# Patient Record
Sex: FEMALE | Race: WHITE | NOT HISPANIC OR LATINO
[De-identification: names, ages, dates, MRNs, and addresses within clinical notes are randomized per-mention and may not be internally consistent; named-entity substitution may affect disease eponyms.]

---

## 2021-01-04 PROBLEM — Z00.00 ENCOUNTER FOR PREVENTIVE HEALTH EXAMINATION: Status: ACTIVE | Noted: 2021-01-04

## 2021-08-16 ENCOUNTER — APPOINTMENT (OUTPATIENT)
Dept: OBGYN | Facility: CLINIC | Age: 41
End: 2021-08-16
Payer: COMMERCIAL

## 2021-08-16 ENCOUNTER — NON-APPOINTMENT (OUTPATIENT)
Age: 41
End: 2021-08-16

## 2021-08-16 VITALS
SYSTOLIC BLOOD PRESSURE: 114 MMHG | WEIGHT: 161 LBS | BODY MASS INDEX: 26.82 KG/M2 | HEIGHT: 65 IN | DIASTOLIC BLOOD PRESSURE: 74 MMHG

## 2021-08-16 DIAGNOSIS — Z78.9 OTHER SPECIFIED HEALTH STATUS: ICD-10-CM

## 2021-08-16 PROCEDURE — 99396 PREV VISIT EST AGE 40-64: CPT

## 2021-08-16 NOTE — PLAN
[FreeTextEntry1] : annual: pap and hpv\par mammogram referral\par doing fine with mirena for now\par likely does not want more kids: d/w pt risks given her age\par

## 2021-08-16 NOTE — HISTORY OF PRESENT ILLNESS
[Patient reported breast sonogram was normal] : Patient reported breast sonogram was normal [Patient reported PAP Smear was normal] : Patient reported PAP Smear was normal [Lighter Bleeding] : bleeding has been lighter than normal [Irregular Duration] : has been irregular [Bleeding Usually Lasts ___ Days] : usually last [unfilled] days [IUD] : has an intrauterine device [Y] : Positive pregnancy history [Normal Amount/Duration] :  normal amount and duration [Currently Active] : currently active [Men] : men [BreastSonogramDate] : 12/2020 [PapSmeardate] : 10/2020 [LMPDate] : 08/12/2021 [MensesLength] : 2 [PGHxTotal] : 2 [de-identified] : Mirena [[facility]] : 2 [PGHxPremature] : 0 [PGHxAbortions] : 0 [[facility]xLiving] : 2 [PGHxABSpont] : 0 [PGHxABInduced] : 0 [PGHxEctopic] : 0 [PGHxMultBirths] : 0 [FreeTextEntry2] : on mirena not really getting much menses

## 2021-08-16 NOTE — PHYSICAL EXAM
[Appropriately responsive] : appropriately responsive [Alert] : alert [No Acute Distress] : no acute distress [No Lymphadenopathy] : no lymphadenopathy [Regular Rate Rhythm] : regular rate rhythm [Clear to Auscultation B/L] : clear to auscultation bilaterally [Soft] : soft [Non-tender] : non-tender [Non-distended] : non-distended [No Mass] : no mass [Oriented x3] : oriented x3 [Examination Of The Breasts] : a normal appearance [No Masses] : no breast masses were palpable [Labia Minora] : normal [Labia Majora] : normal [Normal] : normal [Normal Position] : in a normal position [Uterine Adnexae] : normal

## 2021-08-18 LAB — HPV HIGH+LOW RISK DNA PNL CVX: NOT DETECTED

## 2021-08-19 RX ORDER — CLONAZEPAM 0.5 MG/1
0.5 TABLET ORAL TWICE DAILY
Qty: 45 | Refills: 0 | Status: ACTIVE | COMMUNITY
Start: 2021-08-19 | End: 1900-01-01

## 2021-08-31 LAB — CYTOLOGY CVX/VAG DOC THIN PREP: ABNORMAL

## 2021-09-07 ENCOUNTER — APPOINTMENT (OUTPATIENT)
Dept: OBGYN | Facility: CLINIC | Age: 41
End: 2021-09-07

## 2021-12-03 ENCOUNTER — APPOINTMENT (OUTPATIENT)
Dept: OBGYN | Facility: CLINIC | Age: 41
End: 2021-12-03

## 2022-09-20 ENCOUNTER — APPOINTMENT (OUTPATIENT)
Dept: OBGYN | Facility: CLINIC | Age: 42
End: 2022-09-20

## 2022-09-20 VITALS
HEIGHT: 65 IN | HEART RATE: 75 BPM | DIASTOLIC BLOOD PRESSURE: 70 MMHG | WEIGHT: 161 LBS | BODY MASS INDEX: 26.82 KG/M2 | SYSTOLIC BLOOD PRESSURE: 110 MMHG

## 2022-09-20 DIAGNOSIS — O09.529 SUPERVISION OF ELDERLY MULTIGRAVIDA, UNSPECIFIED TRIMESTER: ICD-10-CM

## 2022-09-20 DIAGNOSIS — Z31.69 ENCOUNTER FOR OTHER GENERAL COUNSELING AND ADVICE ON PROCREATION: ICD-10-CM

## 2022-09-20 PROCEDURE — 58301 REMOVE INTRAUTERINE DEVICE: CPT

## 2022-09-20 PROCEDURE — 99213 OFFICE O/P EST LOW 20 MIN: CPT | Mod: 25

## 2022-09-20 RX ORDER — VITAMIN A, ASCORBIC ACID, VITAMIN D, .ALPHA.-TOCOPHEROL, THIAMINE MONONITRATE, RIBOFLAVIN, NIACIN, PYRIDOXINE HYDROCHLORIDE, FOLIC ACID, CYANOCOBALAMIN, CALCIUM, AND IRON 6000; 60; 400; 10; 1.1; 1.8; 15; 15; 1; 5; 125; 27 [IU]/1; MG/1; [IU]/1; [IU]/1; MG/1; MG/1; MG/1; MG/1; MG/1; UG/1; MG/1; MG/1
CAPSULE ORAL DAILY
Qty: 90 | Refills: 3 | Status: ACTIVE | COMMUNITY
Start: 2022-09-20 | End: 1900-01-01

## 2022-09-20 NOTE — PROCEDURE
[IUD Removal] : intrauterine device (IUD) removal [Risks] : risks [Benefits] : benefits [Alternatives] : alternatives [Fertility Desired] : fertility desired [Speculum Placed] : speculum placed [Nonvisualization of Strings] : nonvisualization of strings [IUD Discarded] : IUD discarded [Tolerated Well] : Patient tolerated the procedure well [No Complications] : no complications [de-identified] : able to retrieve iud strings via forceps in cx canal [FreeTextEntry6] : d/w pt also preconception counseling

## 2022-09-20 NOTE — PLAN
[FreeTextEntry1] : visit consisted of removal of iud but also 20 min counseling and coordination of care regarding TTC again and risks given age 42.\par Over 50% of 20 min visit counseling and coordination of care.\par  d/w pt to take prenatal vitamin, at least 800 mcg folic acid,\par d/w pt risks of pregnancy given age 42, increased risk ges hypertensio/ ges diabetes and increased risk miscarriage and chromosomal abnl\par d/w pt when to TTC\par d/w pt I will recommend low dose ASA at 12 wk of a next pregnancy\par hx of 2 csections, would be for repeat cs.\par did have mild bp elevation at term with her first baby.

## 2022-09-21 ENCOUNTER — TRANSCRIPTION ENCOUNTER (OUTPATIENT)
Age: 42
End: 2022-09-21

## 2022-10-10 ENCOUNTER — APPOINTMENT (OUTPATIENT)
Dept: OBGYN | Facility: CLINIC | Age: 42
End: 2022-10-10

## 2022-10-10 VITALS
BODY MASS INDEX: 26.82 KG/M2 | HEIGHT: 65 IN | SYSTOLIC BLOOD PRESSURE: 108 MMHG | WEIGHT: 161 LBS | DIASTOLIC BLOOD PRESSURE: 75 MMHG

## 2022-10-10 DIAGNOSIS — Z01.419 ENCOUNTER FOR GYNECOLOGICAL EXAMINATION (GENERAL) (ROUTINE) W/OUT ABNORMAL FINDINGS: ICD-10-CM

## 2022-10-10 PROCEDURE — 99396 PREV VISIT EST AGE 40-64: CPT

## 2022-10-10 NOTE — HISTORY OF PRESENT ILLNESS
[Patient reported mammogram was normal] : Patient reported mammogram was normal [Patient reported PAP Smear was normal] : Patient reported PAP Smear was normal [N] : Patient does not use contraception [Monogamous (Male Partner)] : is monogamous with a male partner [Y] : Positive pregnancy history [Normal Amount/Duration] :  normal amount and duration [Regular Cycle Intervals] : periods have been regular [Currently Active] : currently active [Men] : men [No] : No [Mammogramdate] : 10/28/21 [PapSmeardate] : 8/16/21 [LMPDate] : 9/21/22 [MensesFreq] : 28-30 [MensesLength] : 2 [PGHxTotal] : 2 [[facility]] : 2 [FreeTextEntry1] : 9/21/22

## 2022-10-10 NOTE — COUNSELING
Left message for patient to call back.  Will need pharmacy to send in new prescription.   [Preconception Care/ Fertility options] : preconception care, fertility options

## 2022-10-10 NOTE — PLAN
[FreeTextEntry1] : annual: pap and hpv (hx of lgsil in past)\par \par iud recently removed and TTC, issues related to AMA discussed\par will try to get breast imaging with menses (if not pregnant)\par taking prenatal vitamins

## 2022-10-11 LAB — HPV HIGH+LOW RISK DNA PNL CVX: NOT DETECTED

## 2022-10-15 ENCOUNTER — TRANSCRIPTION ENCOUNTER (OUTPATIENT)
Age: 42
End: 2022-10-15

## 2022-10-15 LAB — CYTOLOGY CVX/VAG DOC THIN PREP: NORMAL

## 2022-10-20 DIAGNOSIS — R92.2 INCONCLUSIVE MAMMOGRAM: ICD-10-CM

## 2022-10-20 DIAGNOSIS — R92.8 OTHER ABNORMAL AND INCONCLUSIVE FINDINGS ON DIAGNOSTIC IMAGING OF BREAST: ICD-10-CM

## 2022-10-25 PROBLEM — R92.2 DENSE BREAST TISSUE: Status: ACTIVE | Noted: 2022-10-25

## 2022-10-25 PROBLEM — R92.8 ABNORMAL MAMMOGRAM: Status: ACTIVE | Noted: 2022-10-25

## 2022-10-26 ENCOUNTER — TRANSCRIPTION ENCOUNTER (OUTPATIENT)
Age: 42
End: 2022-10-26

## 2022-12-14 DIAGNOSIS — Z31.41 ENCOUNTER FOR FERTILITY TESTING: ICD-10-CM

## 2023-09-05 RX ORDER — COPPER 313.4 MG/1
INTRAUTERINE DEVICE INTRAUTERINE
Qty: 1 | Refills: 0 | Status: ACTIVE | COMMUNITY
Start: 2023-09-05 | End: 1900-01-01

## 2023-09-13 ENCOUNTER — APPOINTMENT (OUTPATIENT)
Dept: OBGYN | Facility: CLINIC | Age: 43
End: 2023-09-13
Payer: COMMERCIAL

## 2023-09-13 VITALS
SYSTOLIC BLOOD PRESSURE: 107 MMHG | DIASTOLIC BLOOD PRESSURE: 75 MMHG | BODY MASS INDEX: 26.33 KG/M2 | HEIGHT: 65 IN | WEIGHT: 158 LBS | HEART RATE: 73 BPM

## 2023-09-13 DIAGNOSIS — Z11.3 ENCOUNTER FOR SCREENING FOR INFECTIONS WITH A PREDOMINANTLY SEXUAL MODE OF TRANSMISSION: ICD-10-CM

## 2023-09-13 DIAGNOSIS — Z30.430 ENCOUNTER FOR INSERTION OF INTRAUTERINE CONTRACEPTIVE DEVICE: ICD-10-CM

## 2023-09-13 PROCEDURE — 81025 URINE PREGNANCY TEST: CPT

## 2023-09-13 PROCEDURE — 58300 INSERT INTRAUTERINE DEVICE: CPT

## 2023-09-14 LAB
C TRACH RRNA SPEC QL NAA+PROBE: NOT DETECTED
N GONORRHOEA RRNA SPEC QL NAA+PROBE: NOT DETECTED
SOURCE AMPLIFICATION: NORMAL

## 2023-10-11 ENCOUNTER — APPOINTMENT (OUTPATIENT)
Dept: OBGYN | Facility: CLINIC | Age: 43
End: 2023-10-11
Payer: COMMERCIAL

## 2023-10-11 VITALS
WEIGHT: 159 LBS | BODY MASS INDEX: 26.49 KG/M2 | SYSTOLIC BLOOD PRESSURE: 127 MMHG | DIASTOLIC BLOOD PRESSURE: 74 MMHG | HEIGHT: 65 IN | HEART RATE: 60 BPM

## 2023-10-11 DIAGNOSIS — Z80.3 FAMILY HISTORY OF MALIGNANT NEOPLASM OF BREAST: ICD-10-CM

## 2023-10-11 DIAGNOSIS — Z01.419 ENCOUNTER FOR GYNECOLOGICAL EXAMINATION (GENERAL) (ROUTINE) W/OUT ABNORMAL FINDINGS: ICD-10-CM

## 2023-10-11 DIAGNOSIS — Z84.81 FAMILY HISTORY OF CARRIER OF GENETIC DISEASE: ICD-10-CM

## 2023-10-11 PROCEDURE — 99396 PREV VISIT EST AGE 40-64: CPT

## 2023-10-12 PROBLEM — Z01.419 WELL WOMAN EXAM WITH ROUTINE GYNECOLOGICAL EXAM: Status: ACTIVE | Noted: 2022-10-10

## 2023-10-12 LAB — HPV HIGH+LOW RISK DNA PNL CVX: NOT DETECTED

## 2023-10-16 ENCOUNTER — TRANSCRIPTION ENCOUNTER (OUTPATIENT)
Age: 43
End: 2023-10-16

## 2023-10-16 LAB — CYTOLOGY CVX/VAG DOC THIN PREP: NORMAL

## 2023-11-07 ENCOUNTER — APPOINTMENT (OUTPATIENT)
Dept: OBGYN | Facility: CLINIC | Age: 43
End: 2023-11-07
Payer: COMMERCIAL

## 2023-11-07 VITALS
BODY MASS INDEX: 26.13 KG/M2 | DIASTOLIC BLOOD PRESSURE: 73 MMHG | WEIGHT: 157 LBS | OXYGEN SATURATION: 99 % | SYSTOLIC BLOOD PRESSURE: 119 MMHG | HEART RATE: 76 BPM

## 2023-11-07 DIAGNOSIS — Z97.5 PRESENCE OF (INTRAUTERINE) CONTRACEPTIVE DEVICE: ICD-10-CM

## 2023-11-07 PROCEDURE — 58301 REMOVE INTRAUTERINE DEVICE: CPT

## 2023-12-07 ENCOUNTER — APPOINTMENT (OUTPATIENT)
Dept: OBGYN | Facility: CLINIC | Age: 43
End: 2023-12-07
Payer: COMMERCIAL

## 2023-12-07 PROCEDURE — 36415 COLL VENOUS BLD VENIPUNCTURE: CPT

## 2023-12-08 ENCOUNTER — TRANSCRIPTION ENCOUNTER (OUTPATIENT)
Age: 43
End: 2023-12-08

## 2023-12-08 LAB
HCG SERPL-MCNC: 5975 MIU/ML
PROGEST SERPL-MCNC: 20.7 NG/ML

## 2023-12-11 ENCOUNTER — APPOINTMENT (OUTPATIENT)
Dept: OBGYN | Facility: CLINIC | Age: 43
End: 2023-12-11
Payer: COMMERCIAL

## 2023-12-11 PROCEDURE — 36415 COLL VENOUS BLD VENIPUNCTURE: CPT

## 2023-12-12 ENCOUNTER — TRANSCRIPTION ENCOUNTER (OUTPATIENT)
Age: 43
End: 2023-12-12

## 2023-12-12 LAB
HCG SERPL-MCNC: ABNORMAL MIU/ML
PROGEST SERPL-MCNC: 14.7 NG/ML

## 2023-12-15 ENCOUNTER — APPOINTMENT (OUTPATIENT)
Dept: OBGYN | Facility: CLINIC | Age: 43
End: 2023-12-15
Payer: COMMERCIAL

## 2023-12-15 VITALS
DIASTOLIC BLOOD PRESSURE: 73 MMHG | SYSTOLIC BLOOD PRESSURE: 119 MMHG | WEIGHT: 157 LBS | BODY MASS INDEX: 26.13 KG/M2 | HEART RATE: 89 BPM | OXYGEN SATURATION: 98 %

## 2023-12-15 DIAGNOSIS — Z36.89 ENCOUNTER FOR OTHER SPECIFIED ANTENATAL SCREENING: ICD-10-CM

## 2023-12-15 DIAGNOSIS — N92.6 IRREGULAR MENSTRUATION, UNSPECIFIED: ICD-10-CM

## 2023-12-15 DIAGNOSIS — Z32.00 ENCOUNTER FOR PREGNANCY TEST, RESULT UNKNOWN: ICD-10-CM

## 2023-12-15 DIAGNOSIS — Z13.79 ENCOUNTER FOR OTHER SCREENING FOR GENETIC AND CHROMOSOMAL ANOMALIES: ICD-10-CM

## 2023-12-15 PROCEDURE — 99214 OFFICE O/P EST MOD 30 MIN: CPT

## 2023-12-15 PROCEDURE — 76817 TRANSVAGINAL US OBSTETRIC: CPT

## 2023-12-15 PROCEDURE — 81025 URINE PREGNANCY TEST: CPT

## 2023-12-15 NOTE — DISCUSSION/SUMMARY
[FreeTextEntry1] : pregnancy confirmed viable d/w pt high risk issues given her age d/w pt baby ASA at 12 wk hx of GDM hx of 2 csections; will need a third likely will do [facility], refer to MFM d/ w pt routine pregnancy precautions and risk miscarriage given age Over 50% of visit counseling and coordination of care (30 min)

## 2023-12-15 NOTE — PROCEDURE
[Transvaginal OB Sonogram] : Transvaginal OB Sonogram [Intrauterine Pregnancy] : intrauterine pregnancy [Yolk Sac] : yolk sac present [Fetal Heart] : fetal heart present [CRL: ___ (mm)] : CRL - [unfilled]Umm [Current GA by Sonogram: ___ (wks)] : Current GA by Sonogram: [unfilled]Uwks [___ day(s)] : [unfilled] days [Transvaginal OB Sonogram WNL] : Transvaginal OB Sonogram WNL [Amenorrhea] : Amenorrhea [FreeTextEntry1] : viable iup [FreeTextEntry3] : see ob scan

## 2023-12-15 NOTE — PHYSICAL EXAM
[Appropriately responsive] : appropriately responsive [Alert] : alert [No Acute Distress] : no acute distress [Soft] : soft [Non-tender] : non-tender [Non-distended] : non-distended [No HSM] : No HSM [No Lesions] : no lesions [No Mass] : no mass Yes [Oriented x3] : oriented x3 [Labia Majora] : normal [Labia Minora] : normal [Normal] : normal [Uterine Adnexae] : normal [FreeTextEntry6] : gravid 7 wk

## 2023-12-22 ENCOUNTER — TRANSCRIPTION ENCOUNTER (OUTPATIENT)
Age: 43
End: 2023-12-22

## 2023-12-29 ENCOUNTER — APPOINTMENT (OUTPATIENT)
Dept: OBGYN | Facility: CLINIC | Age: 43
End: 2023-12-29
Payer: COMMERCIAL

## 2023-12-29 VITALS
WEIGHT: 163 LBS | OXYGEN SATURATION: 100 % | BODY MASS INDEX: 27.12 KG/M2 | SYSTOLIC BLOOD PRESSURE: 117 MMHG | DIASTOLIC BLOOD PRESSURE: 78 MMHG | HEART RATE: 78 BPM

## 2023-12-29 PROCEDURE — 0502F SUBSEQUENT PRENATAL CARE: CPT

## 2024-01-04 ENCOUNTER — APPOINTMENT (OUTPATIENT)
Dept: OBGYN | Facility: CLINIC | Age: 44
End: 2024-01-04
Payer: COMMERCIAL

## 2024-01-04 VITALS
SYSTOLIC BLOOD PRESSURE: 106 MMHG | BODY MASS INDEX: 27.96 KG/M2 | WEIGHT: 168 LBS | HEART RATE: 87 BPM | OXYGEN SATURATION: 99 % | DIASTOLIC BLOOD PRESSURE: 73 MMHG

## 2024-01-04 PROCEDURE — 36415 COLL VENOUS BLD VENIPUNCTURE: CPT

## 2024-01-04 PROCEDURE — 0502F SUBSEQUENT PRENATAL CARE: CPT

## 2024-01-04 NOTE — PLAN
[FreeTextEntry1] : pregnancy f/u: again confirmed viable pregnancy with good FH normal ges sac and yolk sac high risk due to hx of PET and age (pet with first) also went into labor just 1 or 2 days early with second so hx of cs x 2  advised 2 baby ASA at 12 wks for close f/u

## 2024-01-04 NOTE — PHYSICAL EXAM
[Appropriately responsive] : appropriately responsive [Alert] : alert [No Acute Distress] : no acute distress [Soft] : soft [Non-tender] : non-tender [Non-distended] : non-distended [No HSM] : No HSM [No Lesions] : no lesions [No Mass] : no mass [Oriented x3] : oriented x3 [Labia Majora] : normal [Labia Minora] : normal [Normal] : normal [Uterine Adnexae] : normal [FreeTextEntry6] : gravid , viable pregnancy with good FH

## 2024-01-05 LAB
ALBUMIN SERPL ELPH-MCNC: 4.2 G/DL
ALP BLD-CCNC: 47 U/L
ALT SERPL-CCNC: 25 U/L
ANION GAP SERPL CALC-SCNC: 10 MMOL/L
AST SERPL-CCNC: 21 U/L
BILIRUB SERPL-MCNC: 0.2 MG/DL
BUN SERPL-MCNC: 11 MG/DL
CALCIUM SERPL-MCNC: 8.8 MG/DL
CHLORIDE SERPL-SCNC: 102 MMOL/L
CO2 SERPL-SCNC: 22 MMOL/L
CREAT SERPL-MCNC: 0.47 MG/DL
CREAT SPEC-SCNC: 56 MG/DL
CREAT/PROT UR: 0.1 RATIO
EGFR: 121 ML/MIN/1.73M2
ESTIMATED AVERAGE GLUCOSE: 108 MG/DL
GLUCOSE SERPL-MCNC: 97 MG/DL
HBA1C MFR BLD HPLC: 5.4 %
HCT VFR BLD CALC: 37.2 %
HGB BLD-MCNC: 11.9 G/DL
HIV1+2 AB SPEC QL IA.RAPID: NONREACTIVE
MCHC RBC-ENTMCNC: 29.6 PG
MCHC RBC-ENTMCNC: 32 GM/DL
MCV RBC AUTO: 92.5 FL
PLATELET # BLD AUTO: 288 K/UL
POTASSIUM SERPL-SCNC: 4 MMOL/L
PROT SERPL-MCNC: 6.5 G/DL
PROT UR-MCNC: 6 MG/DL
RBC # BLD: 4.02 M/UL
RBC # FLD: 13.5 %
SODIUM SERPL-SCNC: 134 MMOL/L
TSH SERPL-ACNC: 1.09 UIU/ML
WBC # FLD AUTO: 6.74 K/UL

## 2024-01-08 LAB
ABO + RH PNL BLD: NORMAL
BLD GP AB SCN SERPL QL: NORMAL
HBV SURFACE AG SER QL: NONREACTIVE
HCV AB SER QL: NONREACTIVE
HCV S/CO RATIO: 0.13 S/CO
RUBV IGG FLD-ACNC: 1.6 INDEX
RUBV IGG SER-IMP: POSITIVE
T PALLIDUM AB SER QL IA: NEGATIVE
VZV AB TITR SER: POSITIVE
VZV IGG SER IF-ACNC: 2340 INDEX

## 2024-01-16 ENCOUNTER — TRANSCRIPTION ENCOUNTER (OUTPATIENT)
Age: 44
End: 2024-01-16

## 2024-01-16 RX ORDER — DOXYLAMINE SUCCINATE AND PYRIDOXINE HYDROCHLORIDE 10; 10 MG/1; MG/1
10-10 TABLET, DELAYED RELEASE ORAL
Qty: 60 | Refills: 0 | Status: ACTIVE | COMMUNITY
Start: 2024-01-16 | End: 1900-01-01

## 2024-01-16 RX ORDER — ONDANSETRON 8 MG/1
8 TABLET, ORALLY DISINTEGRATING ORAL
Qty: 3 | Refills: 1 | Status: ACTIVE | COMMUNITY
Start: 2024-01-16 | End: 1900-01-01

## 2024-01-19 ENCOUNTER — ASOB RESULT (OUTPATIENT)
Age: 44
End: 2024-01-19

## 2024-01-19 ENCOUNTER — APPOINTMENT (OUTPATIENT)
Dept: ANTEPARTUM | Facility: CLINIC | Age: 44
End: 2024-01-19
Payer: COMMERCIAL

## 2024-01-19 PROCEDURE — 76813 OB US NUCHAL MEAS 1 GEST: CPT

## 2024-01-19 PROCEDURE — 76801 OB US < 14 WKS SINGLE FETUS: CPT

## 2024-01-19 PROCEDURE — 93976 VASCULAR STUDY: CPT

## 2024-01-19 PROCEDURE — 36415 COLL VENOUS BLD VENIPUNCTURE: CPT

## 2024-01-22 ENCOUNTER — TRANSCRIPTION ENCOUNTER (OUTPATIENT)
Age: 44
End: 2024-01-22

## 2024-01-22 ENCOUNTER — APPOINTMENT (OUTPATIENT)
Dept: ANTEPARTUM | Facility: CLINIC | Age: 44
End: 2024-01-22
Payer: COMMERCIAL

## 2024-01-22 ENCOUNTER — ASOB RESULT (OUTPATIENT)
Age: 44
End: 2024-01-22

## 2024-01-22 PROCEDURE — 59015 CHORION BIOPSY: CPT

## 2024-01-22 PROCEDURE — 76945 ECHO GUIDE VILLUS SAMPLING: CPT

## 2024-01-23 ENCOUNTER — TRANSCRIPTION ENCOUNTER (OUTPATIENT)
Age: 44
End: 2024-01-23

## 2024-01-24 ENCOUNTER — TRANSCRIPTION ENCOUNTER (OUTPATIENT)
Age: 44
End: 2024-01-24

## 2024-01-26 ENCOUNTER — APPOINTMENT (OUTPATIENT)
Dept: OBGYN | Facility: CLINIC | Age: 44
End: 2024-01-26
Payer: COMMERCIAL

## 2024-01-26 VITALS
WEIGHT: 178 LBS | HEART RATE: 93 BPM | SYSTOLIC BLOOD PRESSURE: 114 MMHG | OXYGEN SATURATION: 98 % | DIASTOLIC BLOOD PRESSURE: 77 MMHG | BODY MASS INDEX: 29.62 KG/M2

## 2024-01-26 DIAGNOSIS — O09.91 SUPERVISION OF HIGH RISK PREGNANCY, UNSPECIFIED, FIRST TRIMESTER: ICD-10-CM

## 2024-01-26 PROCEDURE — 0502F SUBSEQUENT PRENATAL CARE: CPT

## 2024-01-26 RX ORDER — ONDANSETRON 8 MG/1
8 TABLET, ORALLY DISINTEGRATING ORAL
Qty: 20 | Refills: 1 | Status: ACTIVE | COMMUNITY
Start: 2024-01-26 | End: 1900-01-01

## 2024-02-02 ENCOUNTER — TRANSCRIPTION ENCOUNTER (OUTPATIENT)
Age: 44
End: 2024-02-02

## 2024-02-21 ENCOUNTER — APPOINTMENT (OUTPATIENT)
Dept: ANTEPARTUM | Facility: CLINIC | Age: 44
End: 2024-02-21
Payer: COMMERCIAL

## 2024-02-21 ENCOUNTER — ASOB RESULT (OUTPATIENT)
Age: 44
End: 2024-02-21

## 2024-02-21 PROCEDURE — 76805 OB US >/= 14 WKS SNGL FETUS: CPT

## 2024-02-21 PROCEDURE — 76817 TRANSVAGINAL US OBSTETRIC: CPT

## 2024-02-28 ENCOUNTER — APPOINTMENT (OUTPATIENT)
Dept: OBGYN | Facility: CLINIC | Age: 44
End: 2024-02-28
Payer: COMMERCIAL

## 2024-02-28 VITALS
HEART RATE: 100 BPM | WEIGHT: 180 LBS | OXYGEN SATURATION: 98 % | SYSTOLIC BLOOD PRESSURE: 121 MMHG | DIASTOLIC BLOOD PRESSURE: 74 MMHG | BODY MASS INDEX: 29.99 KG/M2 | HEIGHT: 65 IN

## 2024-02-28 DIAGNOSIS — O09.92 SUPERVISION OF HIGH RISK PREGNANCY, UNSPECIFIED, SECOND TRIMESTER: ICD-10-CM

## 2024-02-28 PROCEDURE — 0502F SUBSEQUENT PRENATAL CARE: CPT

## 2024-03-01 ENCOUNTER — TRANSCRIPTION ENCOUNTER (OUTPATIENT)
Age: 44
End: 2024-03-01

## 2024-03-04 ENCOUNTER — TRANSCRIPTION ENCOUNTER (OUTPATIENT)
Age: 44
End: 2024-03-04

## 2024-03-04 LAB
AFP MOM: 1.49
AFP VALUE: 54 NG/ML
ALPHA FETOPROTEIN SERUM COMMENT: NORMAL
ALPHA FETOPROTEIN SERUM INTERPRETATION: NORMAL
ALPHA FETOPROTEIN SERUM RESULTS: NORMAL
ALPHA FETOPROTEIN SERUM TEST RESULTS: NORMAL
GESTATIONAL AGE BASED ON: NORMAL
GESTATIONAL AGE ON COLLECTION DATE: 17.4 WEEKS
INSULIN DEP DIABETES: NO
MATERNAL AGE AT EDD AFP: 44.3 YR
MULTIPLE GESTATION: NO
OSBR RISK 1 IN: 2809
RACE: NORMAL
WEIGHT AFP: 180 LBS

## 2024-03-05 ENCOUNTER — TRANSCRIPTION ENCOUNTER (OUTPATIENT)
Age: 44
End: 2024-03-05

## 2024-03-21 ENCOUNTER — NON-APPOINTMENT (OUTPATIENT)
Age: 44
End: 2024-03-21

## 2024-03-21 ENCOUNTER — APPOINTMENT (OUTPATIENT)
Dept: OBGYN | Facility: CLINIC | Age: 44
End: 2024-03-21
Payer: COMMERCIAL

## 2024-03-21 VITALS
WEIGHT: 187 LBS | SYSTOLIC BLOOD PRESSURE: 112 MMHG | BODY MASS INDEX: 31.16 KG/M2 | HEART RATE: 86 BPM | OXYGEN SATURATION: 97 % | HEIGHT: 65 IN | DIASTOLIC BLOOD PRESSURE: 77 MMHG

## 2024-03-21 PROCEDURE — 0502F SUBSEQUENT PRENATAL CARE: CPT

## 2024-03-21 RX ORDER — DOCONEXENT, NIACINAMIDE, .ALPHA.-TOCOPHEROL ACETATE, DL-, CHOLECALCIFEROL, .BETA.-CAROTENE, ASCORBIC ACID, THIAMINE MONONITRATE, RIBOFLAVIN, PYRIDOXINE HYDROCHLORIDE, CYANOCOBALAMIN, IRON, ZINC OXIDE, CUPRIC OXIDE, POTASSIUM IODIDE, MAGNESIUM OXIDE, FOLIC ACID, AND LEVOMEFOLATE CALCIUM 200; 15; 20; 1000; 1100; 30; 1.6; 1.8; 2.5; 12; 29; 25; 2; 150; 20; .4; .6 MG/1; MG/1; [IU]/1; [IU]/1; [IU]/1; MG/1; MG/1; MG/1; MG/1; UG/1; MG/1; MG/1; MG/1; UG/1; MG/1; MG/1; MG/1
29-0.6-0.4-2 CAPSULE, LIQUID FILLED ORAL
Qty: 90 | Refills: 3 | Status: ACTIVE | COMMUNITY
Start: 2024-01-24 | End: 1900-01-01

## 2024-03-25 ENCOUNTER — ASOB RESULT (OUTPATIENT)
Age: 44
End: 2024-03-25

## 2024-03-25 ENCOUNTER — APPOINTMENT (OUTPATIENT)
Dept: ANTEPARTUM | Facility: CLINIC | Age: 44
End: 2024-03-25
Payer: COMMERCIAL

## 2024-03-25 PROCEDURE — 76817 TRANSVAGINAL US OBSTETRIC: CPT

## 2024-03-25 PROCEDURE — 76811 OB US DETAILED SNGL FETUS: CPT

## 2024-04-18 ENCOUNTER — APPOINTMENT (OUTPATIENT)
Dept: OBGYN | Facility: CLINIC | Age: 44
End: 2024-04-18
Payer: COMMERCIAL

## 2024-04-18 ENCOUNTER — NON-APPOINTMENT (OUTPATIENT)
Age: 44
End: 2024-04-18

## 2024-04-18 VITALS
WEIGHT: 194 LBS | SYSTOLIC BLOOD PRESSURE: 115 MMHG | HEART RATE: 112 BPM | BODY MASS INDEX: 32.28 KG/M2 | OXYGEN SATURATION: 99 % | DIASTOLIC BLOOD PRESSURE: 77 MMHG

## 2024-04-18 PROCEDURE — 0502F SUBSEQUENT PRENATAL CARE: CPT

## 2024-04-22 ENCOUNTER — TRANSCRIPTION ENCOUNTER (OUTPATIENT)
Age: 44
End: 2024-04-22

## 2024-04-22 LAB
BASOPHILS # BLD AUTO: 0.03 K/UL
BASOPHILS NFR BLD AUTO: 0.4 %
EOSINOPHIL # BLD AUTO: 0.06 K/UL
EOSINOPHIL NFR BLD AUTO: 0.7 %
GLUCOSE 1H P 50 G GLC PO SERPL-MCNC: 105 MG/DL
HCT VFR BLD CALC: 34 %
HGB BLD-MCNC: 11.1 G/DL
IMM GRANULOCYTES NFR BLD AUTO: 0.4 %
LYMPHOCYTES # BLD AUTO: 1.49 K/UL
LYMPHOCYTES NFR BLD AUTO: 18.3 %
MAN DIFF?: NORMAL
MCHC RBC-ENTMCNC: 31.1 PG
MCHC RBC-ENTMCNC: 32.6 GM/DL
MCV RBC AUTO: 95.2 FL
MONOCYTES # BLD AUTO: 0.48 K/UL
MONOCYTES NFR BLD AUTO: 5.9 %
NEUTROPHILS # BLD AUTO: 6.04 K/UL
NEUTROPHILS NFR BLD AUTO: 74.3 %
PLATELET # BLD AUTO: 255 K/UL
RBC # BLD: 3.57 M/UL
RBC # FLD: 13.9 %
T PALLIDUM AB SER QL IA: NEGATIVE
WBC # FLD AUTO: 8.13 K/UL

## 2024-05-08 ENCOUNTER — APPOINTMENT (OUTPATIENT)
Dept: OBGYN | Facility: CLINIC | Age: 44
End: 2024-05-08
Payer: COMMERCIAL

## 2024-05-08 ENCOUNTER — NON-APPOINTMENT (OUTPATIENT)
Age: 44
End: 2024-05-08

## 2024-05-08 VITALS
HEART RATE: 114 BPM | WEIGHT: 198 LBS | BODY MASS INDEX: 32.99 KG/M2 | HEIGHT: 65 IN | DIASTOLIC BLOOD PRESSURE: 80 MMHG | OXYGEN SATURATION: 97 % | SYSTOLIC BLOOD PRESSURE: 117 MMHG

## 2024-05-08 DIAGNOSIS — O26.812 PREGNANCY RELATED EXHAUSTION AND FATIGUE, SECOND TRIMESTER: ICD-10-CM

## 2024-05-08 PROCEDURE — 0502F SUBSEQUENT PRENATAL CARE: CPT

## 2024-05-09 ENCOUNTER — APPOINTMENT (OUTPATIENT)
Dept: ANTEPARTUM | Facility: CLINIC | Age: 44
End: 2024-05-09
Payer: COMMERCIAL

## 2024-05-09 ENCOUNTER — ASOB RESULT (OUTPATIENT)
Age: 44
End: 2024-05-09

## 2024-05-09 PROCEDURE — 76819 FETAL BIOPHYS PROFIL W/O NST: CPT

## 2024-05-09 PROCEDURE — 76820 UMBILICAL ARTERY ECHO: CPT | Mod: 59

## 2024-05-09 PROCEDURE — 76816 OB US FOLLOW-UP PER FETUS: CPT

## 2024-05-10 ENCOUNTER — TRANSCRIPTION ENCOUNTER (OUTPATIENT)
Age: 44
End: 2024-05-10

## 2024-05-14 DIAGNOSIS — Z98.891 HISTORY OF UTERINE SCAR FROM PREVIOUS SURGERY: ICD-10-CM

## 2024-05-17 LAB
BACTERIA UR CULT: NORMAL
TSH SERPL-ACNC: 1.8 UIU/ML

## 2024-05-23 ENCOUNTER — TRANSCRIPTION ENCOUNTER (OUTPATIENT)
Age: 44
End: 2024-05-23

## 2024-05-24 ENCOUNTER — TRANSCRIPTION ENCOUNTER (OUTPATIENT)
Age: 44
End: 2024-05-24

## 2024-05-24 DIAGNOSIS — Z20.818 CONTACT WITH AND (SUSPECTED) EXPOSURE TO OTHER BACTERIAL COMMUNICABLE DISEASES: ICD-10-CM

## 2024-05-24 DIAGNOSIS — F32.A ANXIETY DISORDER, UNSPECIFIED: ICD-10-CM

## 2024-05-24 DIAGNOSIS — F41.9 ANXIETY DISORDER, UNSPECIFIED: ICD-10-CM

## 2024-05-24 RX ORDER — FLUOXETINE HYDROCHLORIDE 20 MG/1
20 CAPSULE ORAL DAILY
Qty: 30 | Refills: 11 | Status: ACTIVE | COMMUNITY
Start: 2024-05-24 | End: 1900-01-01

## 2024-05-24 RX ORDER — AZITHROMYCIN 250 MG/1
250 TABLET, FILM COATED ORAL
Qty: 1 | Refills: 0 | Status: ACTIVE | COMMUNITY
Start: 2024-05-24 | End: 1900-01-01

## 2024-05-29 ENCOUNTER — APPOINTMENT (OUTPATIENT)
Dept: OBGYN | Facility: CLINIC | Age: 44
End: 2024-05-29
Payer: COMMERCIAL

## 2024-05-29 ENCOUNTER — NON-APPOINTMENT (OUTPATIENT)
Age: 44
End: 2024-05-29

## 2024-05-29 VITALS
SYSTOLIC BLOOD PRESSURE: 109 MMHG | DIASTOLIC BLOOD PRESSURE: 74 MMHG | OXYGEN SATURATION: 97 % | BODY MASS INDEX: 33.49 KG/M2 | HEIGHT: 65 IN | HEART RATE: 113 BPM | WEIGHT: 201 LBS

## 2024-05-29 PROCEDURE — 0502F SUBSEQUENT PRENATAL CARE: CPT

## 2024-06-05 ENCOUNTER — ASOB RESULT (OUTPATIENT)
Age: 44
End: 2024-06-05

## 2024-06-05 ENCOUNTER — APPOINTMENT (OUTPATIENT)
Dept: ANTEPARTUM | Facility: CLINIC | Age: 44
End: 2024-06-05
Payer: COMMERCIAL

## 2024-06-05 PROCEDURE — 76819 FETAL BIOPHYS PROFIL W/O NST: CPT | Mod: 59

## 2024-06-05 PROCEDURE — 76820 UMBILICAL ARTERY ECHO: CPT | Mod: 59

## 2024-06-05 PROCEDURE — 76816 OB US FOLLOW-UP PER FETUS: CPT

## 2024-06-12 ENCOUNTER — NON-APPOINTMENT (OUTPATIENT)
Age: 44
End: 2024-06-12

## 2024-06-12 ENCOUNTER — APPOINTMENT (OUTPATIENT)
Dept: OBGYN | Facility: CLINIC | Age: 44
End: 2024-06-12
Payer: COMMERCIAL

## 2024-06-12 VITALS
SYSTOLIC BLOOD PRESSURE: 128 MMHG | BODY MASS INDEX: 34.78 KG/M2 | WEIGHT: 209 LBS | OXYGEN SATURATION: 98 % | DIASTOLIC BLOOD PRESSURE: 82 MMHG | HEART RATE: 105 BPM

## 2024-06-12 PROCEDURE — 0502F SUBSEQUENT PRENATAL CARE: CPT

## 2024-06-18 ENCOUNTER — APPOINTMENT (OUTPATIENT)
Dept: OBGYN | Facility: CLINIC | Age: 44
End: 2024-06-18
Payer: COMMERCIAL

## 2024-06-18 VITALS
HEART RATE: 118 BPM | DIASTOLIC BLOOD PRESSURE: 76 MMHG | OXYGEN SATURATION: 98 % | SYSTOLIC BLOOD PRESSURE: 123 MMHG | BODY MASS INDEX: 35.11 KG/M2 | WEIGHT: 211 LBS

## 2024-06-18 DIAGNOSIS — O09.93 SUPERVISION OF HIGH RISK PREGNANCY, UNSPECIFIED, THIRD TRIMESTER: ICD-10-CM

## 2024-06-18 PROCEDURE — 0502F SUBSEQUENT PRENATAL CARE: CPT

## 2024-06-20 ENCOUNTER — NON-APPOINTMENT (OUTPATIENT)
Age: 44
End: 2024-06-20

## 2024-06-23 ENCOUNTER — OUTPATIENT (OUTPATIENT)
Dept: OUTPATIENT SERVICES | Facility: HOSPITAL | Age: 44
LOS: 1 days | End: 2024-06-23
Payer: COMMERCIAL

## 2024-06-23 VITALS
RESPIRATION RATE: 19 BRPM | OXYGEN SATURATION: 100 % | HEART RATE: 102 BPM | DIASTOLIC BLOOD PRESSURE: 73 MMHG | TEMPERATURE: 98 F | SYSTOLIC BLOOD PRESSURE: 124 MMHG

## 2024-06-23 DIAGNOSIS — Z3A.34 34 WEEKS GESTATION OF PREGNANCY: ICD-10-CM

## 2024-06-23 DIAGNOSIS — R10.9 UNSPECIFIED ABDOMINAL PAIN: ICD-10-CM

## 2024-06-23 DIAGNOSIS — O26.893 OTHER SPECIFIED PREGNANCY RELATED CONDITIONS, THIRD TRIMESTER: ICD-10-CM

## 2024-06-23 DIAGNOSIS — F41.8 OTHER SPECIFIED ANXIETY DISORDERS: ICD-10-CM

## 2024-06-23 DIAGNOSIS — O99.891 OTHER SPECIFIED DISEASES AND CONDITIONS COMPLICATING PREGNANCY: ICD-10-CM

## 2024-06-23 DIAGNOSIS — O99.343 OTHER MENTAL DISORDERS COMPLICATING PREGNANCY, THIRD TRIMESTER: ICD-10-CM

## 2024-06-23 DIAGNOSIS — O46.93 ANTEPARTUM HEMORRHAGE, UNSPECIFIED, THIRD TRIMESTER: ICD-10-CM

## 2024-06-23 DIAGNOSIS — Z86.32 PERSONAL HISTORY OF GESTATIONAL DIABETES: ICD-10-CM

## 2024-06-23 DIAGNOSIS — O09.213 SUPERVISION OF PREGNANCY WITH HISTORY OF PRE-TERM LABOR, THIRD TRIMESTER: ICD-10-CM

## 2024-06-23 DIAGNOSIS — O34.219 MATERNAL CARE FOR UNSPECIFIED TYPE SCAR FROM PREVIOUS CESAREAN DELIVERY: ICD-10-CM

## 2024-06-23 DIAGNOSIS — Z87.59 PERSONAL HISTORY OF OTHER COMPLICATIONS OF PREGNANCY, CHILDBIRTH AND THE PUERPERIUM: ICD-10-CM

## 2024-06-23 DIAGNOSIS — M54.9 DORSALGIA, UNSPECIFIED: ICD-10-CM

## 2024-06-23 DIAGNOSIS — Z98.891 HISTORY OF UTERINE SCAR FROM PREVIOUS SURGERY: Chronic | ICD-10-CM

## 2024-06-23 DIAGNOSIS — O09.523 SUPERVISION OF ELDERLY MULTIGRAVIDA, THIRD TRIMESTER: ICD-10-CM

## 2024-06-23 DIAGNOSIS — O26.899 OTHER SPECIFIED PREGNANCY RELATED CONDITIONS, UNSPECIFIED TRIMESTER: ICD-10-CM

## 2024-06-23 LAB
AMORPH PHOS CRY # URNS: PRESENT
APPEARANCE UR: ABNORMAL
BACTERIA # UR AUTO: NEGATIVE /HPF — SIGNIFICANT CHANGE UP
BILIRUB UR-MCNC: NEGATIVE — SIGNIFICANT CHANGE UP
CAST: 0 /LPF — SIGNIFICANT CHANGE UP (ref 0–4)
COLOR SPEC: YELLOW — SIGNIFICANT CHANGE UP
DIFF PNL FLD: ABNORMAL
GLUCOSE UR QL: NEGATIVE MG/DL — SIGNIFICANT CHANGE UP
KETONES UR-MCNC: NEGATIVE MG/DL — SIGNIFICANT CHANGE UP
LEUKOCYTE ESTERASE UR-ACNC: ABNORMAL
NITRITE UR-MCNC: NEGATIVE — SIGNIFICANT CHANGE UP
PH UR: 7.5 — SIGNIFICANT CHANGE UP (ref 5–8)
PROT UR-MCNC: NEGATIVE MG/DL — SIGNIFICANT CHANGE UP
RBC CASTS # UR COMP ASSIST: 2 /HPF — SIGNIFICANT CHANGE UP (ref 0–4)
SP GR SPEC: 1.01 — SIGNIFICANT CHANGE UP (ref 1–1.03)
SQUAMOUS # UR AUTO: 17 /HPF — HIGH (ref 0–5)
UROBILINOGEN FLD QL: 0.2 MG/DL — SIGNIFICANT CHANGE UP (ref 0.2–1)
WBC UR QL: 9 /HPF — HIGH (ref 0–5)

## 2024-06-23 PROCEDURE — 87086 URINE CULTURE/COLONY COUNT: CPT

## 2024-06-23 PROCEDURE — 81001 URINALYSIS AUTO W/SCOPE: CPT

## 2024-06-23 PROCEDURE — 59025 FETAL NON-STRESS TEST: CPT

## 2024-06-23 PROCEDURE — 99214 OFFICE O/P EST MOD 30 MIN: CPT

## 2024-06-23 RX ORDER — FLUOXETINE HYDROCHLORIDE 20 MG/1
20 CAPSULE ORAL
Refills: 0 | DISCHARGE

## 2024-06-23 RX ORDER — PRENATAL 136/IRON/FOLIC ACID 27 MG-1 MG
0 TABLET ORAL
Refills: 0 | DISCHARGE

## 2024-06-25 LAB
CULTURE RESULTS: SIGNIFICANT CHANGE UP
SPECIMEN SOURCE: SIGNIFICANT CHANGE UP

## 2024-06-26 ENCOUNTER — NON-APPOINTMENT (OUTPATIENT)
Age: 44
End: 2024-06-26

## 2024-06-26 ENCOUNTER — APPOINTMENT (OUTPATIENT)
Dept: OBGYN | Facility: CLINIC | Age: 44
End: 2024-06-26

## 2024-06-26 VITALS
SYSTOLIC BLOOD PRESSURE: 115 MMHG | DIASTOLIC BLOOD PRESSURE: 79 MMHG | OXYGEN SATURATION: 97 % | HEART RATE: 107 BPM | BODY MASS INDEX: 33.78 KG/M2 | WEIGHT: 203 LBS

## 2024-06-26 PROCEDURE — 0502F SUBSEQUENT PRENATAL CARE: CPT

## 2024-06-27 PROBLEM — F41.9 ANXIETY DISORDER, UNSPECIFIED: Chronic | Status: ACTIVE | Noted: 2024-06-23

## 2024-06-27 PROBLEM — Z87.51 PERSONAL HISTORY OF PRE-TERM LABOR: Chronic | Status: ACTIVE | Noted: 2024-06-23

## 2024-06-27 PROBLEM — O24.419 GESTATIONAL DIABETES MELLITUS IN PREGNANCY, UNSPECIFIED CONTROL: Chronic | Status: ACTIVE | Noted: 2024-06-23

## 2024-06-27 PROBLEM — O14.90 UNSPECIFIED PRE-ECLAMPSIA, UNSPECIFIED TRIMESTER: Chronic | Status: ACTIVE | Noted: 2024-06-23

## 2024-06-28 LAB
HBV SURFACE AG SER QL: NONREACTIVE
HIV1+2 AB SPEC QL IA.RAPID: NONREACTIVE

## 2024-06-29 LAB — B-HEM STREP SPEC QL CULT: NORMAL

## 2024-07-02 ENCOUNTER — ASOB RESULT (OUTPATIENT)
Age: 44
End: 2024-07-02

## 2024-07-02 ENCOUNTER — APPOINTMENT (OUTPATIENT)
Dept: ANTEPARTUM | Facility: CLINIC | Age: 44
End: 2024-07-02
Payer: COMMERCIAL

## 2024-07-02 PROCEDURE — 76820 UMBILICAL ARTERY ECHO: CPT | Mod: 59

## 2024-07-02 PROCEDURE — 76816 OB US FOLLOW-UP PER FETUS: CPT

## 2024-07-02 PROCEDURE — 76819 FETAL BIOPHYS PROFIL W/O NST: CPT

## 2024-07-03 ENCOUNTER — NON-APPOINTMENT (OUTPATIENT)
Age: 44
End: 2024-07-03

## 2024-07-03 ENCOUNTER — APPOINTMENT (OUTPATIENT)
Dept: OBGYN | Facility: CLINIC | Age: 44
End: 2024-07-03
Payer: COMMERCIAL

## 2024-07-03 VITALS
SYSTOLIC BLOOD PRESSURE: 115 MMHG | BODY MASS INDEX: 36.28 KG/M2 | DIASTOLIC BLOOD PRESSURE: 74 MMHG | WEIGHT: 218 LBS | OXYGEN SATURATION: 96 % | HEART RATE: 105 BPM

## 2024-07-03 DIAGNOSIS — O36.60X0 MATERNAL CARE FOR EXCESSIVE FETAL GROWTH, UNSPECIFIED TRIMESTER, NOT APPLICABLE OR UNSPECIFIED: ICD-10-CM

## 2024-07-03 PROCEDURE — 0502F SUBSEQUENT PRENATAL CARE: CPT

## 2024-07-03 RX ORDER — BLOOD-GLUCOSE METER
W/DEVICE KIT MISCELLANEOUS
Qty: 1 | Refills: 0 | Status: ACTIVE | COMMUNITY
Start: 2024-07-03 | End: 1900-01-01

## 2024-07-03 RX ORDER — LANCETS 28 GAUGE
EACH MISCELLANEOUS
Qty: 100 | Refills: 4 | Status: ACTIVE | COMMUNITY
Start: 2024-07-03 | End: 1900-01-01

## 2024-07-03 RX ORDER — BLOOD SUGAR DIAGNOSTIC
STRIP MISCELLANEOUS 4 TIMES DAILY
Qty: 1 | Refills: 3 | Status: ACTIVE | COMMUNITY
Start: 2024-07-03 | End: 1900-01-01

## 2024-07-04 ENCOUNTER — TRANSCRIPTION ENCOUNTER (OUTPATIENT)
Age: 44
End: 2024-07-04

## 2024-07-05 ENCOUNTER — RESULT REVIEW (OUTPATIENT)
Age: 44
End: 2024-07-05

## 2024-07-05 ENCOUNTER — INPATIENT (INPATIENT)
Facility: HOSPITAL | Age: 44
LOS: 1 days | Discharge: HOME | End: 2024-07-07
Attending: OBSTETRICS & GYNECOLOGY | Admitting: OBSTETRICS & GYNECOLOGY
Payer: COMMERCIAL

## 2024-07-05 VITALS
SYSTOLIC BLOOD PRESSURE: 112 MMHG | HEART RATE: 112 BPM | DIASTOLIC BLOOD PRESSURE: 93 MMHG | TEMPERATURE: 98 F | RESPIRATION RATE: 20 BRPM

## 2024-07-05 DIAGNOSIS — Z98.891 HISTORY OF UTERINE SCAR FROM PREVIOUS SURGERY: Chronic | ICD-10-CM

## 2024-07-05 LAB
ALBUMIN SERPL ELPH-MCNC: 3.6 G/DL — SIGNIFICANT CHANGE UP (ref 3.3–5)
ALP SERPL-CCNC: 185 U/L — HIGH (ref 40–120)
ALT FLD-CCNC: 15 U/L — SIGNIFICANT CHANGE UP (ref 10–45)
ANION GAP SERPL CALC-SCNC: 13 MMOL/L — SIGNIFICANT CHANGE UP (ref 5–17)
AST SERPL-CCNC: 20 U/L — SIGNIFICANT CHANGE UP (ref 10–40)
BASOPHILS # BLD AUTO: 0.03 K/UL — SIGNIFICANT CHANGE UP (ref 0–0.2)
BASOPHILS NFR BLD AUTO: 0.3 % — SIGNIFICANT CHANGE UP (ref 0–2)
BILIRUB SERPL-MCNC: 0.2 MG/DL — SIGNIFICANT CHANGE UP (ref 0.2–1.2)
BLD GP AB SCN SERPL QL: NEGATIVE — SIGNIFICANT CHANGE UP
BUN SERPL-MCNC: 10 MG/DL — SIGNIFICANT CHANGE UP (ref 7–23)
CALCIUM SERPL-MCNC: 9.1 MG/DL — SIGNIFICANT CHANGE UP (ref 8.4–10.5)
CHLORIDE SERPL-SCNC: 100 MMOL/L — SIGNIFICANT CHANGE UP (ref 96–108)
CO2 SERPL-SCNC: 21 MMOL/L — LOW (ref 22–31)
CREAT ?TM UR-MCNC: 84 MG/DL — SIGNIFICANT CHANGE UP
CREAT SERPL-MCNC: 0.54 MG/DL — SIGNIFICANT CHANGE UP (ref 0.5–1.3)
EGFR: 116 ML/MIN/1.73M2 — SIGNIFICANT CHANGE UP
EOSINOPHIL # BLD AUTO: 0.04 K/UL — SIGNIFICANT CHANGE UP (ref 0–0.5)
EOSINOPHIL NFR BLD AUTO: 0.4 % — SIGNIFICANT CHANGE UP (ref 0–6)
FIBRINOGEN PPP-MCNC: 499 MG/DL — HIGH (ref 200–445)
GLUCOSE SERPL-MCNC: 68 MG/DL — LOW (ref 70–99)
HCT VFR BLD CALC: 36.4 % — SIGNIFICANT CHANGE UP (ref 34.5–45)
HGB BLD-MCNC: 12 G/DL — SIGNIFICANT CHANGE UP (ref 11.5–15.5)
IMM GRANULOCYTES NFR BLD AUTO: 0.6 % — SIGNIFICANT CHANGE UP (ref 0–0.9)
LDH SERPL L TO P-CCNC: 213 U/L — SIGNIFICANT CHANGE UP (ref 50–242)
LYMPHOCYTES # BLD AUTO: 1.59 K/UL — SIGNIFICANT CHANGE UP (ref 1–3.3)
LYMPHOCYTES # BLD AUTO: 15.4 % — SIGNIFICANT CHANGE UP (ref 13–44)
MCHC RBC-ENTMCNC: 29.5 PG — SIGNIFICANT CHANGE UP (ref 27–34)
MCHC RBC-ENTMCNC: 33 GM/DL — SIGNIFICANT CHANGE UP (ref 32–36)
MCV RBC AUTO: 89.4 FL — SIGNIFICANT CHANGE UP (ref 80–100)
MONOCYTES # BLD AUTO: 0.65 K/UL — SIGNIFICANT CHANGE UP (ref 0–0.9)
MONOCYTES NFR BLD AUTO: 6.3 % — SIGNIFICANT CHANGE UP (ref 2–14)
NEUTROPHILS # BLD AUTO: 7.97 K/UL — HIGH (ref 1.8–7.4)
NEUTROPHILS NFR BLD AUTO: 77 % — SIGNIFICANT CHANGE UP (ref 43–77)
NRBC # BLD: 0 /100 WBCS — SIGNIFICANT CHANGE UP (ref 0–0)
PLATELET # BLD AUTO: 211 K/UL — SIGNIFICANT CHANGE UP (ref 150–400)
POTASSIUM SERPL-MCNC: 3.7 MMOL/L — SIGNIFICANT CHANGE UP (ref 3.5–5.3)
POTASSIUM SERPL-SCNC: 3.7 MMOL/L — SIGNIFICANT CHANGE UP (ref 3.5–5.3)
PROT ?TM UR-MCNC: 24 MG/DL — HIGH (ref 0–12)
PROT SERPL-MCNC: 7.2 G/DL — SIGNIFICANT CHANGE UP (ref 6–8.3)
PROT/CREAT UR-RTO: 0.3 RATIO — HIGH (ref 0–0.2)
RBC # BLD: 4.07 M/UL — SIGNIFICANT CHANGE UP (ref 3.8–5.2)
RBC # FLD: 13.2 % — SIGNIFICANT CHANGE UP (ref 10.3–14.5)
RH IG SCN BLD-IMP: POSITIVE — SIGNIFICANT CHANGE UP
SODIUM SERPL-SCNC: 134 MMOL/L — LOW (ref 135–145)
T PALLIDUM AB TITR SER: NEGATIVE — SIGNIFICANT CHANGE UP
URATE SERPL-MCNC: 4.3 MG/DL — SIGNIFICANT CHANGE UP (ref 2.5–7)
WBC # BLD: 10.34 K/UL — SIGNIFICANT CHANGE UP (ref 3.8–10.5)
WBC # FLD AUTO: 10.34 K/UL — SIGNIFICANT CHANGE UP (ref 3.8–10.5)

## 2024-07-05 PROCEDURE — 88304 TISSUE EXAM BY PATHOLOGIST: CPT | Mod: 26

## 2024-07-05 PROCEDURE — 58600 DIVISION OF FALLOPIAN TUBE: CPT | Mod: U7

## 2024-07-05 PROCEDURE — 58611 LIGATE OVIDUCT(S) ADD-ON: CPT

## 2024-07-05 PROCEDURE — 88307 TISSUE EXAM BY PATHOLOGIST: CPT | Mod: 26

## 2024-07-05 PROCEDURE — 59510 CESAREAN DELIVERY: CPT | Mod: U7

## 2024-07-05 DEVICE — INTERCEED 3 X 4": Type: IMPLANTABLE DEVICE | Status: FUNCTIONAL

## 2024-07-05 RX ORDER — KETOROLAC TROMETHAMINE 30 MG/ML
30 INJECTION, SOLUTION INTRAMUSCULAR EVERY 6 HOURS
Refills: 0 | Status: DISCONTINUED | OUTPATIENT
Start: 2024-07-05 | End: 2024-07-06

## 2024-07-05 RX ORDER — ENOXAPARIN SODIUM 100 MG/ML
40 INJECTION SUBCUTANEOUS ONCE
Refills: 0 | Status: COMPLETED | OUTPATIENT
Start: 2024-07-06 | End: 2024-07-06

## 2024-07-05 RX ORDER — AZITHROMYCIN 250 MG/1
500 TABLET, FILM COATED ORAL ONCE
Refills: 0 | Status: COMPLETED | OUTPATIENT
Start: 2024-07-05 | End: 2024-07-05

## 2024-07-05 RX ORDER — OXYTOCIN 30 [USP'U]/500ML
333.33 INJECTION, SOLUTION INTRAVENOUS
Qty: 20 | Refills: 0 | Status: DISCONTINUED | OUTPATIENT
Start: 2024-07-05 | End: 2024-07-05

## 2024-07-05 RX ORDER — ONDANSETRON HYDROCHLORIDE 2 MG/ML
4 INJECTION INTRAMUSCULAR; INTRAVENOUS ONCE
Refills: 0 | Status: COMPLETED | OUTPATIENT
Start: 2024-07-05 | End: 2024-07-05

## 2024-07-05 RX ORDER — DEXTROSE MONOHYDRATE AND SODIUM CHLORIDE 5; .3 G/100ML; G/100ML
1000 INJECTION, SOLUTION INTRAVENOUS
Refills: 0 | Status: DISCONTINUED | OUTPATIENT
Start: 2024-07-05 | End: 2024-07-05

## 2024-07-05 RX ORDER — TRISODIUM CITRATE DIHYDRATE AND CITRIC ACID MONOHYDRATE 500; 334 MG/5ML; MG/5ML
30 SOLUTION ORAL ONCE
Refills: 0 | Status: DISCONTINUED | OUTPATIENT
Start: 2024-07-05 | End: 2024-07-05

## 2024-07-05 RX ORDER — SIMETHICONE 40MG/0.6ML
80 SUSPENSION, DROPS(FINAL DOSAGE FORM)(ML) ORAL EVERY 4 HOURS
Refills: 0 | Status: DISCONTINUED | OUTPATIENT
Start: 2024-07-05 | End: 2024-07-07

## 2024-07-05 RX ORDER — FLUOXETINE HCL 40 MG
20 CAPSULE ORAL EVERY 24 HOURS
Refills: 0 | Status: DISCONTINUED | OUTPATIENT
Start: 2024-07-05 | End: 2024-07-07

## 2024-07-05 RX ORDER — DEXAMETHASONE 1 MG/1
4 TABLET ORAL EVERY 6 HOURS
Refills: 0 | Status: DISCONTINUED | OUTPATIENT
Start: 2024-07-05 | End: 2024-07-07

## 2024-07-05 RX ORDER — OXYCODONE HYDROCHLORIDE 100 MG/5ML
5 SOLUTION ORAL ONCE
Refills: 0 | Status: DISCONTINUED | OUTPATIENT
Start: 2024-07-05 | End: 2024-07-07

## 2024-07-05 RX ORDER — LANOLIN
1 WAX (GRAM) MISCELLANEOUS EVERY 6 HOURS
Refills: 0 | Status: DISCONTINUED | OUTPATIENT
Start: 2024-07-05 | End: 2024-07-07

## 2024-07-05 RX ORDER — FAMOTIDINE 40 MG
20 TABLET ORAL ONCE
Refills: 0 | Status: COMPLETED | OUTPATIENT
Start: 2024-07-05 | End: 2024-07-05

## 2024-07-05 RX ORDER — ACETAMINOPHEN 325 MG
1000 TABLET ORAL ONCE
Refills: 0 | Status: COMPLETED | OUTPATIENT
Start: 2024-07-05 | End: 2024-07-05

## 2024-07-05 RX ORDER — ACETAMINOPHEN 325 MG
975 TABLET ORAL
Refills: 0 | Status: DISCONTINUED | OUTPATIENT
Start: 2024-07-05 | End: 2024-07-07

## 2024-07-05 RX ORDER — FAMOTIDINE 40 MG
20 TABLET ORAL ONCE
Refills: 0 | Status: DISCONTINUED | OUTPATIENT
Start: 2024-07-05 | End: 2024-07-05

## 2024-07-05 RX ORDER — TRISODIUM CITRATE DIHYDRATE AND CITRIC ACID MONOHYDRATE 500; 334 MG/5ML; MG/5ML
30 SOLUTION ORAL ONCE
Refills: 0 | Status: COMPLETED | OUTPATIENT
Start: 2024-07-05 | End: 2024-07-05

## 2024-07-05 RX ORDER — OXYTOCIN 30 [USP'U]/500ML
333.33 INJECTION, SOLUTION INTRAVENOUS
Qty: 20 | Refills: 0 | Status: DISCONTINUED | OUTPATIENT
Start: 2024-07-05 | End: 2024-07-07

## 2024-07-05 RX ORDER — NALOXONE HYDROCHLORIDE 1 MG/ML
0.1 INJECTION PARENTERAL
Refills: 0 | Status: DISCONTINUED | OUTPATIENT
Start: 2024-07-05 | End: 2024-07-07

## 2024-07-05 RX ORDER — CEFAZOLIN 10 G/1
2000 INJECTION, POWDER, FOR SOLUTION INTRAVENOUS ONCE
Refills: 0 | Status: COMPLETED | OUTPATIENT
Start: 2024-07-05 | End: 2024-07-05

## 2024-07-05 RX ORDER — OXYCODONE HYDROCHLORIDE 100 MG/5ML
5 SOLUTION ORAL
Refills: 0 | Status: COMPLETED | OUTPATIENT
Start: 2024-07-05 | End: 2024-07-12

## 2024-07-05 RX ORDER — TETANUS TOXOID, REDUCED DIPHTHERIA TOXOID AND ACELLULAR PERTUSSIS VACCINE, ADSORBED 5; 2.5; 8; 8; 2.5 [IU]/.5ML; [IU]/.5ML; UG/.5ML; UG/.5ML; UG/.5ML
0.5 SUSPENSION INTRAMUSCULAR ONCE
Refills: 0 | Status: DISCONTINUED | OUTPATIENT
Start: 2024-07-05 | End: 2024-07-07

## 2024-07-05 RX ORDER — DIPHENHYDRAMINE HCL 12.5MG/5ML
25 ELIXIR ORAL EVERY 6 HOURS
Refills: 0 | Status: COMPLETED | OUTPATIENT
Start: 2024-07-05 | End: 2025-06-03

## 2024-07-05 RX ORDER — CEFAZOLIN 10 G/1
2000 INJECTION, POWDER, FOR SOLUTION INTRAVENOUS ONCE
Refills: 0 | Status: DISCONTINUED | OUTPATIENT
Start: 2024-07-05 | End: 2024-07-05

## 2024-07-05 RX ORDER — DEXTROSE MONOHYDRATE AND SODIUM CHLORIDE 5; .3 G/100ML; G/100ML
1000 INJECTION, SOLUTION INTRAVENOUS
Refills: 0 | Status: DISCONTINUED | OUTPATIENT
Start: 2024-07-05 | End: 2024-07-07

## 2024-07-05 RX ADMIN — Medication 1 APPLICATION(S): at 08:53

## 2024-07-05 RX ADMIN — DEXTROSE MONOHYDRATE AND SODIUM CHLORIDE 200 MILLILITER(S): 5; .3 INJECTION, SOLUTION INTRAVENOUS at 08:51

## 2024-07-05 RX ADMIN — Medication 20 MILLIGRAM(S): at 08:51

## 2024-07-05 RX ADMIN — Medication 975 MILLIGRAM(S): at 21:18

## 2024-07-05 RX ADMIN — KETOROLAC TROMETHAMINE 30 MILLIGRAM(S): 30 INJECTION, SOLUTION INTRAMUSCULAR at 18:18

## 2024-07-05 RX ADMIN — Medication 1000 MILLIGRAM(S): at 14:48

## 2024-07-05 RX ADMIN — Medication 20 MILLIGRAM(S): at 22:22

## 2024-07-05 RX ADMIN — AZITHROMYCIN 255 MILLIGRAM(S): 250 TABLET, FILM COATED ORAL at 12:03

## 2024-07-05 RX ADMIN — ONDANSETRON HYDROCHLORIDE 4 MILLIGRAM(S): 2 INJECTION INTRAMUSCULAR; INTRAVENOUS at 08:54

## 2024-07-05 RX ADMIN — OXYTOCIN 1000 MILLIUNIT(S)/MIN: 30 INJECTION, SOLUTION INTRAVENOUS at 14:53

## 2024-07-05 RX ADMIN — CEFAZOLIN 100 MILLIGRAM(S): 10 INJECTION, POWDER, FOR SOLUTION INTRAVENOUS at 11:24

## 2024-07-05 RX ADMIN — Medication 400 MILLIGRAM(S): at 14:30

## 2024-07-05 RX ADMIN — KETOROLAC TROMETHAMINE 30 MILLIGRAM(S): 30 INJECTION, SOLUTION INTRAMUSCULAR at 23:18

## 2024-07-05 RX ADMIN — Medication 80 MILLIGRAM(S): at 22:22

## 2024-07-05 RX ADMIN — Medication 80 MILLIGRAM(S): at 18:20

## 2024-07-05 RX ADMIN — TRISODIUM CITRATE DIHYDRATE AND CITRIC ACID MONOHYDRATE 30 MILLILITER(S): 500; 334 SOLUTION ORAL at 10:05

## 2024-07-05 NOTE — OB PROVIDER H&P - HISTORY OF PRESENT ILLNESS
Patient is a  44y  at 35+6 presenting for r/o ROM. She states at 0400 she felt a big gush of pink tinged fluid which has been leaking ever since. She denies VB, CTX. Endorses nausea and FM.     Ante: Spontaneous pregnancy. CVS for AMA wnl. NIPT and anatomy scan wnl. Passed GCT. Normotensive.   EFW 3320g 96%ile  GBS neg    OBHX:  G1 - 2016 CS elective for PEC w/o SF, GDMA1 at 37w  G2 - 2018 PPROM 36w rCS  G3 - current  GynHX: denies fibroids, ovarian cysts. Remote hx abnormal pap, s/p colpo wnl. Hx genital HSV age 19 was first and only outbreak.   MedHX: denies  Surghx: CS x 2  Medications: PNV, ASA 81 (last taken last night), fluoxetine 20  Allergies: NKDA    Physical Exam:  T(C): 36.8 (24 @ 06:03), Max: 36.8 (24 @ 06:03)  HR: 112 (24 @ 06:03) (112 - 112)  BP: 112/93 (24 @ 06:03) (112/93 - 112/93)  RR: 20 (24 @ 06:03) (20 - 20)  SpO2: --    General: NAD  Pulm: no increased WOB  Abdomen: soft, gravid, nontender  Extremities: wnl     TAUS: Cephalic  VE: ft/soft    EFM: 145 bpm, mod variability, + accels, rare variable decels; reassuring  [place]: CTX q 4 min

## 2024-07-05 NOTE — OB RN PATIENT PROFILE - FALL HARM RISK - UNIVERSAL INTERVENTIONS
Bed in lowest position, wheels locked, appropriate side rails in place/Call bell, personal items and telephone in reach/Instruct patient to call for assistance before getting out of bed or chair/Non-slip footwear when patient is out of bed/[place] to call system/Physically safe environment - no spills, clutter or unnecessary equipment/Purposeful Proactive Rounding/Room/bathroom lighting operational, light cord in reach

## 2024-07-05 NOTE — OB RN PATIENT PROFILE - CAREGIVER RELATION TO PATIENT
The patient was located at Home: [address]  Provider was located at Home ([facility]): [place]         Total time spent for this encounter: Not billed by time    --Quynh Scott MD on 4/22/2023 at 12:46 PM    An electronic signature was used to authenticate this note.
spouse

## 2024-07-05 NOTE — OB RN TRIAGE NOTE - MENTAL HEALTH CONDITIONS/SYMPTOMS, PROFILE
Advised pt of results. Pt stated understanding.    ----- Message from JD Mann sent at 4/28/2021  8:36 AM CDT -----  Regarding: PSA  PSA is low at 0.7 follow up as scheduled  ----- Message -----  From: Brooke Keys MA  Sent: 4/27/2021   7:56 AM CDT  To: JD Mann         depression

## 2024-07-05 NOTE — OB RN TRIAGE NOTE - COMFORT/ACCEPTABLE PAIN LEVEL (0-10)
0 Valtrex Pregnancy And Lactation Text: this medication is Pregnancy Category B and is considered safe during pregnancy. This medication is not directly found in breast milk but it's metabolite acyclovir is present.

## 2024-07-05 NOTE — OB PROVIDER H&P - ASSESSMENT
Face Mask
44y  at 35+6 presenting for r/o ROM    - Admit to L&D  - Consent signed for rCS  - NPO  - IV fluids and labs ordered  - GBS -  - Ancefian ordered for infection ppx  - Continuous EFM until OR    Discussed with attending Dr. Marie Pena, PGY3

## 2024-07-05 NOTE — OB PROVIDER DELIVERY SUMMARY - NSPROVIDERDELIVERYNOTE_OBGYN_ALL_OB_FT
43yo presented at 35w6d with PPROM and repeat c/s #3 with bilateral salpingectomy. Vigorous male infant was delivered using vacuum assisted delivery. Placenta delivered and sent to pathology. Uterus unable to be exteriorized, closed in one layer with 1 vicryl suture. Bilateral salpingectomy performed with ligasure. Serosa of bladder re-approximated. Muscle closed with vicryl. Fascia closed with 1 vicryl. Subq closed with 2-0 vicryl. Skin closed with 3-0 monocryl on keeth needle. EBL 500cc, IVF 2500cc. UOP 250cc. Dictation #[id number]

## 2024-07-05 NOTE — OB PROVIDER DELIVERY SUMMARY - NSSELHIDDEN_OBGYN_ALL_OB_FT
[NS_DeliveryAttending1_OBGYN_ALL_OB_FT:[id number]==],[NS_DeliveryAssist1_OBGYN_ALL_OB_FT:[id number]=],[NS_DeliveryRN_OBGYN_ALL_OB_FT:[id number]=],[NS_CirculateRN2_OBGYN_ALL_OB_FT:[id number]]

## 2024-07-05 NOTE — OB NEONATOLOGY/PEDIATRICIAN DELIVERY SUMMARY - NSPEDSNEONOTESA_OBGYN_ALL_OB_FT
Called to delivery for repeat c/s at 35.6 weeks. Vacuum assist with no pop offs, loose nuchal x1. Baby emerged with good cry and tone. DCC x 30 seconds. Placed on open warmer, D/S/S. PE WNL, 3 vessel cord. Voided x 3. In no acute distress. Pulse ox placed on right wrist, SpO2 at 10 minutes of line 92-97%. Baby brought to the NICU for prematurity.

## 2024-07-05 NOTE — OB RN INTRAOPERATIVE NOTE - NSSELHIDDEN_OBGYN_ALL_OB_FT
[NS_DeliveryAttending1_OBGYN_ALL_OB_FT:[id number]==],[NS_DeliveryAssist1_OBGYN_ALL_OB_FT:[id number]],[NS_DeliveryRN_OBGYN_ALL_OB_FT:[id number]=],[NS_CirculateRN2_OBGYN_ALL_OB_FT:[id number]]

## 2024-07-05 NOTE — OB RN INTRAOPERATIVE NOTE - NS_ELECTROSURGICALUNITBIOMEDNUMBER_OBGYN_ALL_OB_FT
[id number]  pad# [id number]  exp 01/31/2026 [id number]  pad# [id number]  exp 01/31/2026  [facility]# [id number]  exp 04/09/2029

## 2024-07-05 NOTE — OB RN DELIVERY SUMMARY - NSDELAYEDCLAMPA_OBGYN_ALL_OB
Subjective:      History was provided by the father. Sejal Woods is a 8 m.o. male who is brought in for this well child visit. Birth History    Birth     Length: 1' 8\" (0.508 m)     Weight: 6 lb 8.9 oz (2.975 kg)     HC 34.3 cm    Apgar     One: 7     Five: 8    Delivery Method: Vaginal, Vacuum (Extractor)    Gestation Age: 44 2/7 wks    Duration of Labor: 1st: 6h 33m / 2nd: 41m     NMS- ABNORMAL CYSTIC FIBROSIS SCREEN - Reported on 19  Repeated NMS on 19 and mailed out on 19  Repeated NMS - ABNORMAL HEMOGLOBINOPATHY SCREEN - Reported on 3/4/19     Patient Active Problem List    Diagnosis Date Noted    Liveborn infant by vaginal delivery 2019     No past medical history on file. Immunization History   Administered Date(s) Administered    FTuT-Xoi-DBY 2019, 2019, 2019    Hep B, Adol/Ped 2019, 2019, 2019    Pneumococcal Conjugate (PCV-13) 2019, 2019, 2019    Rotavirus, Live, Monovalent Vaccine 2019, 2019     History of previous adverse reactions to immunizations:no    Current Issues:  Current concerns on the part of Addie's father include none. Review of Nutrition:  Current feeding pattern: Similac 6-8oz  Current nutrition:  Baby foods, water    Social Screening:  Current child-care arrangements: in home: primary caregiver: father  Parental coping and self-care: Doing well; no concerns. Secondhand smoke exposure? no    Rear-facing carseat  Has not yet seen a dentist  Objective:     Visit Vitals  Temp 97 °F (36.1 °C) (Rectal)   Ht (!) 2' 4\" (0.711 m)   Wt 19 lb 8.3 oz (8.854 kg)   HC 46.5 cm   BMI 17.50 kg/m²     Growth parameters are noted and are appropriate for age.      General:  alert, no distress, appears stated age, interactive   Skin:  normal   Head:  normal fontanelles, nl appearance, supple neck   Eyes:  sclerae white, pupils equal and reactive, red reflex normal bilaterally   Ears:  normal bilateral Mouth: No perioral or gingival cyanosis or lesions. Tongue is normal in appearance. Lungs:  clear to auscultation bilaterally   Heart:  regular rate and rhythm, S1, S2 normal, no murmur, click, rub or gallop   Abdomen:  soft, non-tender. Bowel sounds normal. No masses,  no organomegaly   Screening DDH:  Ortolani's and Varma's signs absent bilaterally, leg length symmetrical, thigh & gluteal folds symmetrical   :  normal male - testes descended bilaterally   Femoral pulses:  present bilaterally   Extremities:  extremities normal, atraumatic, no cyanosis or edema   Neuro:  alert, moves all extremities spontaneously     Assessment:     Addie is a healthy 10 m.o. old infant here for well check    Plan:     1. Anticipatory guidance: avoiding cow's milk till 15mos old, weaning to cup at 9-12mos of ago, making middle-of-night feeds \"brief & boring\", car seat issues, including proper placement, \"child-proofing\" home with cabinet locks, outlet plugs, window guards and stair, never leave unattended     2. Laboratory screening    Hb or HCT (CDC recc's for children at risk between 9-12mos then again 6mos later; AAP recommends once age 5-12mos): No    3. AP pelvis x-ray to screen for developmental dysplasia of the hip:  no    4. Orders placed during this Well Child Exam:  Orders Placed This Encounter    INFLUENZA VIRUS VACCINE QUADRIVALENT, PRESERVATIVE FREE SYRINGE ([id number])     Order Specific Question:   Was provider counseling for all components provided during this visit? Answer:   Yes     Follow-up and Dispositions    · Return in about 2 months (around 1/15/2020). Yes

## 2024-07-05 NOTE — OB RN DELIVERY SUMMARY - NSSELHIDDEN_OBGYN_ALL_OB_FT
[NS_DeliveryAttending1_OBGYN_ALL_OB_FT:[id number]==],[NS_DeliveryAssist1_OBGYN_ALL_OB_FT:[id number]=],[NS_DeliveryRN_OBGYN_ALL_OB_FT:[id number]],[NS_CirculateRN2_OBGYN_ALL_OB_FT:[id number]=]

## 2024-07-05 NOTE — OB RN PATIENT PROFILE - NS_ISOLATION_OBGYN_ALL_OB
Hx kidney stones, UTI. C/o Left flank pain, urinary frequency, dysuria since yesterday. Felt febrile at home.    None

## 2024-07-06 LAB
BASOPHILS # BLD AUTO: 0.02 K/UL — SIGNIFICANT CHANGE UP (ref 0–0.2)
BASOPHILS NFR BLD AUTO: 0.2 % — SIGNIFICANT CHANGE UP (ref 0–2)
EOSINOPHIL # BLD AUTO: 0.01 K/UL — SIGNIFICANT CHANGE UP (ref 0–0.5)
EOSINOPHIL NFR BLD AUTO: 0.1 % — SIGNIFICANT CHANGE UP (ref 0–6)
HCT VFR BLD CALC: 28.7 % — LOW (ref 34.5–45)
HGB BLD-MCNC: 9.4 G/DL — LOW (ref 11.5–15.5)
IMM GRANULOCYTES NFR BLD AUTO: 0.5 % — SIGNIFICANT CHANGE UP (ref 0–0.9)
LYMPHOCYTES # BLD AUTO: 1.85 K/UL — SIGNIFICANT CHANGE UP (ref 1–3.3)
LYMPHOCYTES # BLD AUTO: 15.5 % — SIGNIFICANT CHANGE UP (ref 13–44)
MCHC RBC-ENTMCNC: 29.5 PG — SIGNIFICANT CHANGE UP (ref 27–34)
MCHC RBC-ENTMCNC: 32.8 GM/DL — SIGNIFICANT CHANGE UP (ref 32–36)
MCV RBC AUTO: 90 FL — SIGNIFICANT CHANGE UP (ref 80–100)
MONOCYTES # BLD AUTO: 0.74 K/UL — SIGNIFICANT CHANGE UP (ref 0–0.9)
MONOCYTES NFR BLD AUTO: 6.2 % — SIGNIFICANT CHANGE UP (ref 2–14)
NEUTROPHILS # BLD AUTO: 9.23 K/UL — HIGH (ref 1.8–7.4)
NEUTROPHILS NFR BLD AUTO: 77.5 % — HIGH (ref 43–77)
NRBC # BLD: 0 /100 WBCS — SIGNIFICANT CHANGE UP (ref 0–0)
PLATELET # BLD AUTO: 181 K/UL — SIGNIFICANT CHANGE UP (ref 150–400)
RBC # BLD: 3.19 M/UL — LOW (ref 3.8–5.2)
RBC # FLD: 13 % — SIGNIFICANT CHANGE UP (ref 10.3–14.5)
WBC # BLD: 11.91 K/UL — HIGH (ref 3.8–10.5)
WBC # FLD AUTO: 11.91 K/UL — HIGH (ref 3.8–10.5)

## 2024-07-06 RX ORDER — DIPHENHYDRAMINE HCL 12.5MG/5ML
25 ELIXIR ORAL EVERY 6 HOURS
Refills: 0 | Status: DISCONTINUED | OUTPATIENT
Start: 2024-07-06 | End: 2024-07-07

## 2024-07-06 RX ADMIN — Medication 600 MILLIGRAM(S): at 23:20

## 2024-07-06 RX ADMIN — Medication 975 MILLIGRAM(S): at 14:42

## 2024-07-06 RX ADMIN — ENOXAPARIN SODIUM 40 MILLIGRAM(S): 100 INJECTION SUBCUTANEOUS at 06:13

## 2024-07-06 RX ADMIN — Medication 25 MILLIGRAM(S): at 05:00

## 2024-07-06 RX ADMIN — Medication 80 MILLIGRAM(S): at 09:47

## 2024-07-06 RX ADMIN — KETOROLAC TROMETHAMINE 30 MILLIGRAM(S): 30 INJECTION, SOLUTION INTRAMUSCULAR at 06:13

## 2024-07-06 RX ADMIN — Medication 80 MILLIGRAM(S): at 02:54

## 2024-07-06 RX ADMIN — Medication 975 MILLIGRAM(S): at 02:53

## 2024-07-06 RX ADMIN — Medication 975 MILLIGRAM(S): at 20:10

## 2024-07-06 RX ADMIN — Medication 600 MILLIGRAM(S): at 17:55

## 2024-07-06 RX ADMIN — Medication 975 MILLIGRAM(S): at 09:47

## 2024-07-06 RX ADMIN — Medication 20 MILLIGRAM(S): at 21:27

## 2024-07-06 RX ADMIN — KETOROLAC TROMETHAMINE 30 MILLIGRAM(S): 30 INJECTION, SOLUTION INTRAMUSCULAR at 11:50

## 2024-07-06 NOTE — PROGRESS NOTE ADULT - ATTENDING COMMENTS
Pt is a now POD#1 s/p uncomplicated ( / c/s).  Patient reports that she feels well. She is ambulating without issue, voiding spontaneously, passing gas and tolerating regular diet. She denies fevers, chills, SOB, sore throat, cough, abdominal pain or any other complaints.     Vitals reviewed and are within normal limits. Appropriate physical exam- abdomen is soft and appropriately tender with firm fundus below the umbilicus. Lochia is mild.    Continue with routine postoperative care.     Patient is in agreement with the plan and has no additional needs or questions at this time. Pt is a now POD#1 s/p repeat c/s #3 with bilateral salpingectomy complicated by cystotomy repair.  Patient reports that she feels well. She is ambulating without issue, issa in place draining clear yellow urine, passing gas and tolerating regular diet. She denies fevers, chills, SOB, sore throat, cough, abdominal pain or any other complaints.     Vitals reviewed and are within normal limits. Appropriate physical exam- abdomen is soft and appropriately tender with firm fundus below the umbilicus. Incision is c/d/i.     Continue with routine postoperative care.     Patient is in agreement with the plan and has no additional needs or questions at this time. Pt is a now POD#1 s/p repeat c/s #3 with bilateral salpingectomy complicated by repair of denuded bladder serosa.  Patient reports that she feels well. She is ambulating without issue, voided 800cc of clear urine, passing gas and tolerating regular diet. She denies fevers, chills, SOB, sore throat, cough, abdominal pain or any other complaints.     Vitals reviewed and are within normal limits. Appropriate physical exam- abdomen is soft and appropriately tender with firm fundus below the umbilicus. Incision is c/d/i.     Continue with routine postoperative care. Pt cleared for discharge home per pt request. Patient is in agreement with the plan and has no additional needs or questions at this time. Pt is a now POD#1 s/p repeat c/s #3 with bilateral salpingectomy complicated by repair of denuded bladder serosa.  Patient reports that she feels well. She is ambulating without issue, issa in place, passing gas and tolerating regular diet. She denies fevers, chills, SOB, sore throat, cough, abdominal pain or any other complaints.     Vitals reviewed and are within normal limits. Appropriate physical exam- abdomen is soft and appropriately tender with firm fundus below the umbilicus. Incision is c/d/i.     Continue with routine postoperative care. Patient is in agreement with the plan and has no additional needs or questions at this time.

## 2024-07-06 NOTE — LACTATION INITIAL EVALUATION - LACTATION INTERVENTIONS
35.6 wk s/p NICU. 26hrs old. mother states baby is able to latch and she supplements with formula. at this time mother declines hands on help as she is waiting for her mother to come visit and she wants her to feed the baby a bottle. discussed pumping to maximize milk supply. plans to pump "at home". pump placed at bedside and mother to alert staff if/when she decides to pump at the hospital. reviewed TFP and supplementation amounts. made aware of LC availability for latch assistance if desired./initiate/review safe skin-to-skin/initiate/review supplementation plan due to medical indications/initiate/review alternate feeding method/reviewed components of an effective feeding and at least 8 effective feedings per day required/reviewed importance of monitoring infant diapers, the breastfeeding log, and minimum output each day/reviewed feeding on demand/by cue at least 8 times a day/recommended follow-up with pediatrician within 24 hours of discharge/reviewed indications of inadequate milk transfer that would require supplementation

## 2024-07-07 VITALS
TEMPERATURE: 98 F | OXYGEN SATURATION: 100 % | SYSTOLIC BLOOD PRESSURE: 122 MMHG | HEART RATE: 95 BPM | RESPIRATION RATE: 18 BRPM | DIASTOLIC BLOOD PRESSURE: 77 MMHG

## 2024-07-07 PROCEDURE — 59050 FETAL MONITOR W/REPORT: CPT

## 2024-07-07 PROCEDURE — 80053 COMPREHEN METABOLIC PANEL: CPT

## 2024-07-07 PROCEDURE — 86780 TREPONEMA PALLIDUM: CPT

## 2024-07-07 PROCEDURE — 86850 RBC ANTIBODY SCREEN: CPT

## 2024-07-07 PROCEDURE — 84156 ASSAY OF PROTEIN URINE: CPT

## 2024-07-07 PROCEDURE — 86900 BLOOD TYPING SEROLOGIC ABO: CPT

## 2024-07-07 PROCEDURE — C1765: CPT

## 2024-07-07 PROCEDURE — 84550 ASSAY OF BLOOD/URIC ACID: CPT

## 2024-07-07 PROCEDURE — 86901 BLOOD TYPING SEROLOGIC RH(D): CPT

## 2024-07-07 PROCEDURE — 85025 COMPLETE CBC W/AUTO DIFF WBC: CPT

## 2024-07-07 PROCEDURE — 36415 COLL VENOUS BLD VENIPUNCTURE: CPT

## 2024-07-07 PROCEDURE — 83615 LACTATE (LD) (LDH) ENZYME: CPT

## 2024-07-07 PROCEDURE — 82570 ASSAY OF URINE CREATININE: CPT

## 2024-07-07 PROCEDURE — 85384 FIBRINOGEN ACTIVITY: CPT

## 2024-07-07 RX ORDER — OXYCODONE HYDROCHLORIDE 100 MG/5ML
5 SOLUTION ORAL
Refills: 0 | Status: DISCONTINUED | OUTPATIENT
Start: 2024-07-07 | End: 2024-07-07

## 2024-07-07 RX ORDER — ACETAMINOPHEN 325 MG
3 TABLET ORAL
Qty: 0 | Refills: 0 | DISCHARGE
Start: 2024-07-07

## 2024-07-07 RX ORDER — OXYCODONE HYDROCHLORIDE 100 MG/5ML
1 SOLUTION ORAL
Qty: 6 | Refills: 0
Start: 2024-07-07 | End: 2024-07-08

## 2024-07-07 RX ADMIN — Medication 975 MILLIGRAM(S): at 02:11

## 2024-07-07 RX ADMIN — OXYCODONE HYDROCHLORIDE 5 MILLIGRAM(S): 100 SOLUTION ORAL at 13:23

## 2024-07-07 RX ADMIN — Medication 600 MILLIGRAM(S): at 05:21

## 2024-07-07 RX ADMIN — OXYCODONE HYDROCHLORIDE 5 MILLIGRAM(S): 100 SOLUTION ORAL at 14:55

## 2024-07-07 RX ADMIN — Medication 975 MILLIGRAM(S): at 09:13

## 2024-07-07 RX ADMIN — Medication 600 MILLIGRAM(S): at 12:25

## 2024-07-07 NOTE — DISCHARGE NOTE OB - CARE PROVIDER_API CALL
Aleyda Alex  Obstetrics and Gynecology  [address] [place] [id number]  Phone: [phone]  Fax: [phone]  Follow Up Time:

## 2024-07-07 NOTE — DISCHARGE NOTE OB - NS MD DC FALL RISK RISK
For information on Fall & Injury Prevention, visit: [email or web address] OR  [email or web address] OR  https://www.cdc.gov/steadi/patient.html

## 2024-07-07 NOTE — DISCHARGE NOTE OB - PATIENT PORTAL LINK FT
You can access the FollowMyHealth Patient Portal offered by [facility] by registering at the following website: [email or web address]. By joining [facility]’s FollowMyHealth portal, you will also be able to view your health information using other applications (apps) compatible with our system.

## 2024-07-07 NOTE — DISCHARGE NOTE OB - MEDICATION SUMMARY - MEDICATIONS TO TAKE
I will START or STAY ON the medications listed below when I get home from the hospital:    ibuprofen 600 mg oral tablet  -- 1 tab(s) by mouth every 6 hours  -- Indication: For Pain    acetaminophen 325 mg oral tablet  -- 3 tab(s) by mouth every 6 hours as needed for  mild pain  -- Indication: For Pain    oxyCODONE 5 mg oral tablet  -- 1 tab(s) by mouth every 8 hours as needed for  severe pain MDD: 3  -- Indication: For Pain    FLUoxetine  -- 20 milligram(s) by mouth once a day  -- Indication: For home    multivitamin, prenatal  -- Indication: For HOme   I will START or STAY ON the medications listed below when I get home from the hospital:    ibuprofen 600 mg oral tablet  -- 1 tab(s) by mouth every 6 hours  -- Indication: For Pain    acetaminophen 325 mg oral tablet  -- 3 tab(s) by mouth every 6 hours as needed for  mild pain  -- Indication: For Pain    oxyCODONE 5 mg oral tablet  -- 1 tab(s) by mouth every 8 hours as needed for  severe pain MDD: 3  -- Indication: For Pain    FLUoxetine  -- 20 milligram(s) by mouth once a day  -- Indication: For home    multivitamin, prenatal  -- Indication: For home

## 2024-07-07 NOTE — DISCHARGE NOTE OB - CAREGIVER ADDRESS
Called daughter; mailbox full     ----- Message from YENY Sands sent at 3/4/2022 12:30 PM CST -----  Labs reviewed.  Bump in BUN and creatinine, potassium and drop in sodium.  However per CardioMEMS she is on the upper end of her range so not dehydrated per se.  Discussed with team and recommend stopping Entresto.  This should help renal function and sodium as well as potassium to come back down.  In the interim just to avoid high potassium foods for the next week.  We will continue to monitor CardioMEMS and adjust diuretic as needed.    
same as pt

## 2024-07-07 NOTE — DISCHARGE NOTE OB - CHANGE SANITARY PADS FREQUENTLY.  WASHING AND WIPING SHOULD OCCUR FROM FRONT TO BACK
----- Message from Cristina Díaz sent at 3/25/2019  9:44 AM CDT -----  Contact: Pt    Name of Who is Calling:ALEX LINDO [[id number]]    What is the request in detail: patient would like a call back regarding hormone replacement medication dosage have changed Please contact to further discuss and advise    Can the clinic reply by [facility]: No    What Number to Call Back if not in [facility]: [phone]                                   Statement Selected

## 2024-07-07 NOTE — PROGRESS NOTE ADULT - SUBJECTIVE AND OBJECTIVE BOX
Patient evaluated at bedside this morning, resting comfortable in bed, with no acute events overnight.  She reports pain is well controlled with oral pain medications.   She denies headache, dizziness, chest pain, palpitations, shortness of breath, nausea, vomiting or heavy vaginal bleeding.  She is ambulating,  issa remains in place at this time. Tolerating regular diet.     Physical Exam:  Vital Signs Last 24 Hrs  T(C): 36.6 (06 Jul 2024 02:30), Max: 36.9 (05 Jul 2024 21:36)  T(F): 97.9 (06 Jul 2024 02:30), Max: 98.4 (05 Jul 2024 21:36)  HR: 75 (06 Jul 2024 02:30) (70 - 112)  BP: 111/69 (06 Jul 2024 02:30) (99/58 - 143/83)  BP(mean): 78 (05 Jul 2024 15:03) (74 - 78)  RR: 18 (06 Jul 2024 02:30) (17 - 20)  SpO2: 96% (06 Jul 2024 02:30) (93% - 99%)    Parameters below as of 06 Jul 2024 02:30  Patient On (Oxygen Delivery Method): room air        GA: NAD, A+0 x 3  Pulm: no increased WOB  Abd: soft, nontender, nondistended, no rebound or guarding, incision clean, dry and intact, uterus firm at midline, 2 fb below umbilicus  : issa in situ, lochia WNL  Extremities: no calf tenderness, SCDs in place                            12.0   10.34 )-----------( 211      ( 05 Jul 2024 08:27 )             36.4     07-05    134<L>  |  100  |  10  ----------------------------<  68<L>  3.7   |  21<L>  |  0.54    Ca    9.1      05 Jul 2024 11:16    TPro  7.2  /  Alb  3.6  /  TBili  0.2  /  DBili  x   /  AST  20  /  ALT  15  /  AlkPhos  185<H>  07-05        acetaminophen     Tablet .. 975 milliGRAM(s) Oral <User Schedule>  dexAMETHasone  Injectable 4 milliGRAM(s) IV Push every 6 hours PRN  diphenhydrAMINE 25 milliGRAM(s) Oral every 6 hours PRN  diphtheria/tetanus/pertussis (acellular) Vaccine (Adacel) 0.5 milliLiter(s) IntraMuscular once  enoxaparin Injectable 40 milliGRAM(s) SubCutaneous once  FLUoxetine 20 milliGRAM(s) Oral every 24 hours  ibuprofen  Tablet. 600 milliGRAM(s) Oral every 6 hours  ketorolac   Injectable 30 milliGRAM(s) IV Push every 6 hours  lactated ringers. 1000 milliLiter(s) IV Continuous <Continuous>  lanolin Ointment 1 Application(s) Topical every 6 hours PRN  magnesium hydroxide Suspension 30 milliLiter(s) Oral two times a day PRN  naloxone Injectable 0.1 milliGRAM(s) IV Push every 3 minutes PRN  oxyCODONE    IR 5 milliGRAM(s) Oral every 3 hours PRN  oxyCODONE    IR 5 milliGRAM(s) Oral once PRN  oxytocin Infusion 333.333 milliUNIT(s)/Min IV Continuous <Continuous>  simethicone 80 milliGRAM(s) Chew every 4 hours PRN  
Patient evaluated at bedside this morning, resting comfortable in bed, no acute events overnight. She reports pain is well-controlled.  She denies headache, dizziness, changes in vision, chest pain, palpitations, shortness of breath, RUQ pain, nausea, vomiting, fever, chills, heavy vaginal bleeding.  She has been ambulating without assistance, tolerating food.      Physical Exam:  T(C): 36.6 (07-07-24 @ 05:23), Max: 36.6 (07-06-24 @ 22:05)  HR: 98 (07-07-24 @ 05:23) (78 - 98)  BP: 115/76 (07-07-24 @ 05:23) (111/73 - 115/76)  RR: 16 (07-07-24 @ 05:23) (16 - 18)  SpO2: 98% (07-07-24 @ 05:23) (95% - 98%)    Gen: no apparent distress  Pulm: no increased work of breathing  Abd: soft, nontender, nondistended, no rebound or guarding, uterus firm; incision clean dry intact  Extremities: trace pedal edema bilaterally, no calf tenderness bilaterally                          9.4    11.91 )-----------( 181      ( 06 Jul 2024 06:46 )             28.7     07-05    134<L>  |  100  |  10  ----------------------------<  68<L>  3.7   |  21<L>  |  0.54    Ca    9.1      05 Jul 2024 11:16    TPro  7.2  /  Alb  3.6  /  TBili  0.2  /  DBili  x   /  AST  20  /  ALT  15  /  AlkPhos  185<H>  07-05    acetaminophen     Tablet .. 975 milliGRAM(s) Oral <User Schedule>  dexAMETHasone  Injectable 4 milliGRAM(s) IV Push every 6 hours PRN  diphenhydrAMINE 25 milliGRAM(s) Oral every 6 hours PRN  diphtheria/tetanus/pertussis (acellular) Vaccine (Adacel) 0.5 milliLiter(s) IntraMuscular once  FLUoxetine 20 milliGRAM(s) Oral every 24 hours  ibuprofen  Tablet. 600 milliGRAM(s) Oral every 6 hours  lactated ringers. 1000 milliLiter(s) IV Continuous <Continuous>  lanolin Ointment 1 Application(s) Topical every 6 hours PRN  magnesium hydroxide Suspension 30 milliLiter(s) Oral two times a day PRN  naloxone Injectable 0.1 milliGRAM(s) IV Push every 3 minutes PRN  oxyCODONE    IR 5 milliGRAM(s) Oral every 3 hours PRN  oxyCODONE    IR 5 milliGRAM(s) Oral once PRN  oxytocin Infusion 333.333 milliUNIT(s)/Min IV Continuous <Continuous>  simethicone 80 milliGRAM(s) Chew every 4 hours PRN

## 2024-07-07 NOTE — PROGRESS NOTE ADULT - ASSESSMENT
A/P  44y s/p repeat CS and BS, POD# 2, stable, meeting postpartum milestones   - Acute blood loss anemia: Hgb 9.4 postoperative; patient asymptomatic  - Pain: well controlled on analgesics as above  - GI: Tolerating regular diet  - : issa in place; will remove today and f/u TOV  - DVT prophylaxis: ambulating frequently  - Dispo: PPD 2, unless otherwise specified    
44y Female POD#1  s/p repeat C/S and bilateral salpingectomy                                 - Neuro/Pain:  toradol atc, tylenol atc, oxy prn  - CV:  VS per routine, AM CBC pending  - Pulm: Encourage ISS & Ambulation  - GI: regular diet   - : Johnson in place x 48 hours   - DVT ppx: SCDs, Lovenox 40mg QD  - Dispo: POD #2/3

## 2024-07-07 NOTE — DISCHARGE NOTE OB - HOSPITAL COURSE
pt s/p C/S #3+BS after presenting for PPROM at 35+6 wks. C/S c/b sersal repair of bladder. Kept issa in for 2 days and then passed TOV. pp course uncomplicated, meeting pp milestones. Clinically and hemodynamically stable for d/c.

## 2024-07-07 NOTE — PROGRESS NOTE ADULT - ATTENDING COMMENTS
Pt is a now POD#2 s/p repeat c/s #3 with bilateral salpingectomy complicated by repair of denuded bladder serosa.  Patient reports that she feels well. She is ambulating without issue, voided 800cc of clear urine, passing gas and tolerating regular diet. She denies fevers, chills, SOB, sore throat, cough, abdominal pain or any other complaints.     Vitals reviewed and are within normal limits. Appropriate physical exam- abdomen is soft and appropriately tender with firm fundus below the umbilicus. Incision is c/d/i.     Continue with routine postoperative care. Pt cleared for discharge home per pt request. Patient is in agreement with the plan and has no additional needs or questions at this time.

## 2024-07-09 ENCOUNTER — APPOINTMENT (OUTPATIENT)
Dept: ANTEPARTUM | Facility: CLINIC | Age: 44
End: 2024-07-09

## 2024-07-10 ENCOUNTER — NON-APPOINTMENT (OUTPATIENT)
Age: 44
End: 2024-07-10

## 2024-07-10 ENCOUNTER — APPOINTMENT (OUTPATIENT)
Dept: OBGYN | Facility: CLINIC | Age: 44
End: 2024-07-10

## 2024-07-10 LAB — SURGICAL PATHOLOGY STUDY: SIGNIFICANT CHANGE UP

## 2024-07-11 ENCOUNTER — NON-APPOINTMENT (OUTPATIENT)
Age: 44
End: 2024-07-11

## 2024-07-13 DIAGNOSIS — O34.211 MATERNAL CARE FOR LOW TRANSVERSE SCAR FROM PREVIOUS CESAREAN DELIVERY: ICD-10-CM

## 2024-07-13 DIAGNOSIS — Z3A.35 35 WEEKS GESTATION OF PREGNANCY: ICD-10-CM

## 2024-07-13 DIAGNOSIS — D62 ACUTE POSTHEMORRHAGIC ANEMIA: ICD-10-CM

## 2024-07-18 ENCOUNTER — NON-APPOINTMENT (OUTPATIENT)
Age: 44
End: 2024-07-18

## 2024-07-18 ENCOUNTER — APPOINTMENT (OUTPATIENT)
Dept: OBGYN | Facility: CLINIC | Age: 44
End: 2024-07-18

## 2024-07-18 VITALS
HEART RATE: 88 BPM | WEIGHT: 192 LBS | BODY MASS INDEX: 31.95 KG/M2 | DIASTOLIC BLOOD PRESSURE: 78 MMHG | SYSTOLIC BLOOD PRESSURE: 119 MMHG | OXYGEN SATURATION: 98 %

## 2024-07-18 PROCEDURE — 0503F POSTPARTUM CARE VISIT: CPT

## 2024-08-21 ENCOUNTER — APPOINTMENT (OUTPATIENT)
Dept: OBGYN | Facility: CLINIC | Age: 44
End: 2024-08-21

## 2024-08-21 VITALS
SYSTOLIC BLOOD PRESSURE: 109 MMHG | WEIGHT: 185 LBS | OXYGEN SATURATION: 97 % | HEART RATE: 85 BPM | BODY MASS INDEX: 30.79 KG/M2 | DIASTOLIC BLOOD PRESSURE: 64 MMHG

## 2024-08-21 DIAGNOSIS — Z12.31 ENCOUNTER FOR SCREENING MAMMOGRAM FOR MALIGNANT NEOPLASM OF BREAST: ICD-10-CM

## 2024-08-21 DIAGNOSIS — M62.89 OTHER SPECIFIED DISORDERS OF MUSCLE: ICD-10-CM

## 2024-08-21 PROCEDURE — 0503F POSTPARTUM CARE VISIT: CPT

## 2024-10-24 ENCOUNTER — TRANSCRIPTION ENCOUNTER (OUTPATIENT)
Age: 44
End: 2024-10-24

## 2025-02-19 ENCOUNTER — APPOINTMENT (OUTPATIENT)
Dept: OBGYN | Facility: CLINIC | Age: 45
End: 2025-02-19
Payer: COMMERCIAL

## 2025-02-19 VITALS
SYSTOLIC BLOOD PRESSURE: 111 MMHG | HEART RATE: 81 BPM | OXYGEN SATURATION: 97 % | WEIGHT: 176.37 LBS | BODY MASS INDEX: 29.35 KG/M2 | DIASTOLIC BLOOD PRESSURE: 68 MMHG

## 2025-02-19 DIAGNOSIS — Z12.11 ENCOUNTER FOR SCREENING FOR MALIGNANT NEOPLASM OF COLON: ICD-10-CM

## 2025-02-19 PROCEDURE — 99396 PREV VISIT EST AGE 40-64: CPT

## 2025-02-28 ENCOUNTER — NON-APPOINTMENT (OUTPATIENT)
Age: 45
End: 2025-02-28

## 2025-02-28 LAB
CYTOLOGY CVX/VAG DOC THIN PREP: ABNORMAL
HPV HIGH+LOW RISK DNA PNL CVX: NOT DETECTED

## 2025-04-09 ENCOUNTER — TRANSCRIPTION ENCOUNTER (OUTPATIENT)
Age: 45
End: 2025-04-09

## 2025-04-11 ENCOUNTER — TRANSCRIPTION ENCOUNTER (OUTPATIENT)
Age: 45
End: 2025-04-11

## 2025-04-11 DIAGNOSIS — R92.8 OTHER ABNORMAL AND INCONCLUSIVE FINDINGS ON DIAGNOSTIC IMAGING OF BREAST: ICD-10-CM

## 2025-04-22 DIAGNOSIS — R92.1 MAMMOGRAPHIC CALCIFICATION FOUND ON DIAGNOSTIC IMAGING OF BREAST: ICD-10-CM

## 2025-04-23 ENCOUNTER — RESULT REVIEW (OUTPATIENT)
Age: 45
End: 2025-04-23

## 2025-05-05 ENCOUNTER — TRANSCRIPTION ENCOUNTER (OUTPATIENT)
Age: 45
End: 2025-05-05

## 2025-05-05 DIAGNOSIS — Z98.890 OTHER SPECIFIED POSTPROCEDURAL STATES: ICD-10-CM

## 2025-05-06 ENCOUNTER — NON-APPOINTMENT (OUTPATIENT)
Age: 45
End: 2025-05-06

## 2025-05-06 ENCOUNTER — TRANSCRIPTION ENCOUNTER (OUTPATIENT)
Age: 45
End: 2025-05-06

## 2025-05-06 ENCOUNTER — APPOINTMENT (OUTPATIENT)
Dept: INTERNAL MEDICINE | Facility: CLINIC | Age: 45
End: 2025-05-06
Payer: COMMERCIAL

## 2025-05-06 VITALS
SYSTOLIC BLOOD PRESSURE: 111 MMHG | HEIGHT: 65 IN | TEMPERATURE: 97.9 F | DIASTOLIC BLOOD PRESSURE: 79 MMHG | BODY MASS INDEX: 27.82 KG/M2 | WEIGHT: 167 LBS | OXYGEN SATURATION: 99 % | HEART RATE: 73 BPM

## 2025-05-06 DIAGNOSIS — Z30.430 ENCOUNTER FOR INSERTION OF INTRAUTERINE CONTRACEPTIVE DEVICE: ICD-10-CM

## 2025-05-06 DIAGNOSIS — F32.A ANXIETY DISORDER, UNSPECIFIED: ICD-10-CM

## 2025-05-06 DIAGNOSIS — Z13.79 ENCOUNTER FOR OTHER SCREENING FOR GENETIC AND CHROMOSOMAL ANOMALIES: ICD-10-CM

## 2025-05-06 DIAGNOSIS — Z32.00 ENCOUNTER FOR PREGNANCY TEST, RESULT UNKNOWN: ICD-10-CM

## 2025-05-06 DIAGNOSIS — Z97.5 PRESENCE OF (INTRAUTERINE) CONTRACEPTIVE DEVICE: ICD-10-CM

## 2025-05-06 DIAGNOSIS — F41.9 ANXIETY DISORDER, UNSPECIFIED: ICD-10-CM

## 2025-05-06 DIAGNOSIS — Z13.220 ENCOUNTER FOR SCREENING FOR LIPOID DISORDERS: ICD-10-CM

## 2025-05-06 DIAGNOSIS — Z31.69 ENCOUNTER FOR OTHER GENERAL COUNSELING AND ADVICE ON PROCREATION: ICD-10-CM

## 2025-05-06 DIAGNOSIS — Z31.41 ENCOUNTER FOR FERTILITY TESTING: ICD-10-CM

## 2025-05-06 DIAGNOSIS — N92.6 IRREGULAR MENSTRUATION, UNSPECIFIED: ICD-10-CM

## 2025-05-06 DIAGNOSIS — Z01.00 ENCOUNTER FOR EXAMINATION OF EYES AND VISION W/OUT ABNORMAL FINDINGS: ICD-10-CM

## 2025-05-06 DIAGNOSIS — H53.001 UNSPECIFIED AMBLYOPIA, RIGHT EYE: ICD-10-CM

## 2025-05-06 DIAGNOSIS — O09.529 SUPERVISION OF ELDERLY MULTIGRAVIDA, UNSPECIFIED TRIMESTER: ICD-10-CM

## 2025-05-06 PROCEDURE — 36415 COLL VENOUS BLD VENIPUNCTURE: CPT

## 2025-05-06 PROCEDURE — 99386 PREV VISIT NEW AGE 40-64: CPT

## 2025-05-07 ENCOUNTER — TRANSCRIPTION ENCOUNTER (OUTPATIENT)
Age: 45
End: 2025-05-07

## 2025-05-07 LAB
ALBUMIN SERPL ELPH-MCNC: 4.4 G/DL
ALP BLD-CCNC: 55 U/L
ALT SERPL-CCNC: 17 U/L
ANION GAP SERPL CALC-SCNC: 11 MMOL/L
AST SERPL-CCNC: 20 U/L
BASOPHILS # BLD AUTO: 0.05 K/UL
BASOPHILS NFR BLD AUTO: 0.9 %
BILIRUB SERPL-MCNC: 0.3 MG/DL
BUN SERPL-MCNC: 11 MG/DL
CALCIUM SERPL-MCNC: 9.4 MG/DL
CHLORIDE SERPL-SCNC: 105 MMOL/L
CHOLEST SERPL-MCNC: 208 MG/DL
CO2 SERPL-SCNC: 24 MMOL/L
CREAT SERPL-MCNC: 0.61 MG/DL
EGFRCR SERPLBLD CKD-EPI 2021: 112 ML/MIN/1.73M2
EOSINOPHIL # BLD AUTO: 0.08 K/UL
EOSINOPHIL NFR BLD AUTO: 1.4 %
ESTIMATED AVERAGE GLUCOSE: 105 MG/DL
GLUCOSE SERPL-MCNC: 93 MG/DL
HBA1C MFR BLD HPLC: 5.3 %
HCT VFR BLD CALC: 40.2 %
HDLC SERPL-MCNC: 63 MG/DL
HGB BLD-MCNC: 12.8 G/DL
IMM GRANULOCYTES NFR BLD AUTO: 0 %
LDLC SERPL-MCNC: 130 MG/DL
LYMPHOCYTES # BLD AUTO: 2.58 K/UL
LYMPHOCYTES NFR BLD AUTO: 44.8 %
MAN DIFF?: NORMAL
MCHC RBC-ENTMCNC: 29.2 PG
MCHC RBC-ENTMCNC: 31.8 G/DL
MCV RBC AUTO: 91.8 FL
MONOCYTES # BLD AUTO: 0.45 K/UL
MONOCYTES NFR BLD AUTO: 7.8 %
NEUTROPHILS # BLD AUTO: 2.6 K/UL
NEUTROPHILS NFR BLD AUTO: 45.1 %
NONHDLC SERPL-MCNC: 144 MG/DL
PLATELET # BLD AUTO: 322 K/UL
POTASSIUM SERPL-SCNC: 4.4 MMOL/L
PROT SERPL-MCNC: 7.1 G/DL
RBC # BLD: 4.38 M/UL
RBC # FLD: 13.2 %
SODIUM SERPL-SCNC: 140 MMOL/L
TRIGL SERPL-MCNC: 79 MG/DL
TSH SERPL-ACNC: 1.35 UIU/ML
WBC # FLD AUTO: 5.76 K/UL

## 2025-05-08 ENCOUNTER — TRANSCRIPTION ENCOUNTER (OUTPATIENT)
Age: 45
End: 2025-05-08

## 2025-05-09 ENCOUNTER — APPOINTMENT (OUTPATIENT)
Dept: ORTHOPEDIC SURGERY | Age: 45
End: 2025-05-09
Payer: COMMERCIAL

## 2025-05-09 VITALS — RESPIRATION RATE: 16 BRPM | BODY MASS INDEX: 27.49 KG/M2 | WEIGHT: 165 LBS | HEIGHT: 65 IN

## 2025-05-09 DIAGNOSIS — M25.559 PAIN IN UNSPECIFIED HIP: ICD-10-CM

## 2025-05-09 DIAGNOSIS — M62.89 OTHER SPECIFIED DISORDERS OF MUSCLE: ICD-10-CM

## 2025-05-09 PROCEDURE — 99204 OFFICE O/P NEW MOD 45 MIN: CPT

## 2025-05-30 ENCOUNTER — TRANSCRIPTION ENCOUNTER (OUTPATIENT)
Age: 45
End: 2025-05-30

## 2025-05-30 DIAGNOSIS — Z98.890 OTHER SPECIFIED POSTPROCEDURAL STATES: ICD-10-CM

## 2025-06-18 ENCOUNTER — TRANSCRIPTION ENCOUNTER (OUTPATIENT)
Age: 45
End: 2025-06-18

## 2025-06-23 ENCOUNTER — APPOINTMENT (OUTPATIENT)
Dept: OBGYN | Facility: CLINIC | Age: 45
End: 2025-06-23
Payer: COMMERCIAL

## 2025-06-23 VITALS
SYSTOLIC BLOOD PRESSURE: 112 MMHG | DIASTOLIC BLOOD PRESSURE: 75 MMHG | BODY MASS INDEX: 27.49 KG/M2 | HEIGHT: 65 IN | OXYGEN SATURATION: 96 % | WEIGHT: 165 LBS | HEART RATE: 67 BPM

## 2025-06-23 PROBLEM — N76.0 ACUTE VAGINITIS: Status: ACTIVE | Noted: 2025-06-23 | Resolved: 2025-07-23

## 2025-06-23 PROBLEM — R21 PERINEAL RASH: Status: ACTIVE | Noted: 2025-06-23

## 2025-06-23 PROCEDURE — 99213 OFFICE O/P EST LOW 20 MIN: CPT

## 2025-06-23 RX ORDER — FLUOCINOLONE ACETONIDE 0.25 MG/G
0.03 OINTMENT TOPICAL TWICE DAILY
Qty: 1 | Refills: 1 | Status: ACTIVE | COMMUNITY
Start: 2025-06-23 | End: 1900-01-01

## 2025-07-07 ENCOUNTER — TRANSCRIPTION ENCOUNTER (OUTPATIENT)
Age: 45
End: 2025-07-07

## 2025-07-10 ENCOUNTER — APPOINTMENT (OUTPATIENT)
Dept: ORTHOPEDIC SURGERY | Age: 45
End: 2025-07-10
Payer: COMMERCIAL

## 2025-07-10 VITALS — HEIGHT: 65 IN | RESPIRATION RATE: 16 BRPM | WEIGHT: 165 LBS | BODY MASS INDEX: 27.49 KG/M2

## 2025-07-10 PROBLEM — M25.551 ACUTE PAIN OF RIGHT HIP: Status: ACTIVE | Noted: 2025-07-10

## 2025-07-10 PROCEDURE — 99214 OFFICE O/P EST MOD 30 MIN: CPT

## 2025-07-11 ENCOUNTER — TRANSCRIPTION ENCOUNTER (OUTPATIENT)
Age: 45
End: 2025-07-11

## 2025-07-18 ENCOUNTER — TRANSCRIPTION ENCOUNTER (OUTPATIENT)
Age: 45
End: 2025-07-18

## 2025-08-05 ENCOUNTER — TRANSCRIPTION ENCOUNTER (OUTPATIENT)
Age: 45
End: 2025-08-05

## 2025-08-06 ENCOUNTER — APPOINTMENT (OUTPATIENT)
Facility: CLINIC | Age: 45
End: 2025-08-06
Payer: COMMERCIAL

## 2025-08-06 DIAGNOSIS — M25.851 OTHER SPECIFIED JOINT DISORDERS, RIGHT HIP: ICD-10-CM

## 2025-08-06 DIAGNOSIS — S73.199A OTHER SPRAIN OF UNSPECIFIED HIP, INITIAL ENCOUNTER: ICD-10-CM

## 2025-08-06 PROCEDURE — 99214 OFFICE O/P EST MOD 30 MIN: CPT | Mod: 95

## 2025-08-07 ENCOUNTER — APPOINTMENT (OUTPATIENT)
Dept: ORTHOPEDIC SURGERY | Age: 45
End: 2025-08-07
Payer: COMMERCIAL

## 2025-08-07 DIAGNOSIS — M25.559 PAIN IN UNSPECIFIED HIP: ICD-10-CM

## 2025-08-07 PROCEDURE — 20611 DRAIN/INJ JOINT/BURSA W/US: CPT | Mod: RT
